# Patient Record
Sex: MALE | Race: WHITE | NOT HISPANIC OR LATINO | URBAN - METROPOLITAN AREA
[De-identification: names, ages, dates, MRNs, and addresses within clinical notes are randomized per-mention and may not be internally consistent; named-entity substitution may affect disease eponyms.]

---

## 2018-04-16 ENCOUNTER — EMERGENCY (EMERGENCY)
Facility: HOSPITAL | Age: 58
LOS: 1 days | Discharge: HOME | End: 2018-04-16
Attending: EMERGENCY MEDICINE | Admitting: EMERGENCY MEDICINE
Payer: COMMERCIAL

## 2018-04-16 VITALS
TEMPERATURE: 98 F | DIASTOLIC BLOOD PRESSURE: 105 MMHG | RESPIRATION RATE: 15 BRPM | HEART RATE: 77 BPM | HEIGHT: 69 IN | WEIGHT: 235.01 LBS | SYSTOLIC BLOOD PRESSURE: 200 MMHG

## 2018-04-16 VITALS
TEMPERATURE: 98 F | RESPIRATION RATE: 16 BRPM | DIASTOLIC BLOOD PRESSURE: 97 MMHG | OXYGEN SATURATION: 96 % | HEART RATE: 65 BPM | SYSTOLIC BLOOD PRESSURE: 140 MMHG

## 2018-04-16 LAB
ALBUMIN SERPL ELPH-MCNC: 3.9 G/DL — SIGNIFICANT CHANGE UP (ref 3.3–5)
ALP SERPL-CCNC: 99 U/L — SIGNIFICANT CHANGE UP (ref 40–120)
ALT FLD-CCNC: 22 U/L — SIGNIFICANT CHANGE UP (ref 12–78)
ANION GAP SERPL CALC-SCNC: 5 MMOL/L — SIGNIFICANT CHANGE UP (ref 5–17)
APTT BLD: 30.8 SEC — SIGNIFICANT CHANGE UP (ref 27.5–37.4)
AST SERPL-CCNC: 18 U/L — SIGNIFICANT CHANGE UP (ref 15–37)
BASOPHILS # BLD AUTO: 0.1 K/UL — SIGNIFICANT CHANGE UP (ref 0–0.2)
BASOPHILS NFR BLD AUTO: 1.1 % — SIGNIFICANT CHANGE UP (ref 0–2)
BILIRUB SERPL-MCNC: 1 MG/DL — SIGNIFICANT CHANGE UP (ref 0.2–1.2)
BUN SERPL-MCNC: 13 MG/DL — SIGNIFICANT CHANGE UP (ref 7–23)
CALCIUM SERPL-MCNC: 9.2 MG/DL — SIGNIFICANT CHANGE UP (ref 8.5–10.1)
CHLORIDE SERPL-SCNC: 106 MMOL/L — SIGNIFICANT CHANGE UP (ref 96–108)
CO2 SERPL-SCNC: 29 MMOL/L — SIGNIFICANT CHANGE UP (ref 22–31)
CREAT SERPL-MCNC: 0.92 MG/DL — SIGNIFICANT CHANGE UP (ref 0.5–1.3)
EOSINOPHIL # BLD AUTO: 0.1 K/UL — SIGNIFICANT CHANGE UP (ref 0–0.5)
EOSINOPHIL NFR BLD AUTO: 1.4 % — SIGNIFICANT CHANGE UP (ref 0–6)
GLUCOSE SERPL-MCNC: 101 MG/DL — HIGH (ref 70–99)
HCT VFR BLD CALC: 50.6 % — HIGH (ref 39–50)
HGB BLD-MCNC: 16.9 G/DL — SIGNIFICANT CHANGE UP (ref 13–17)
INR BLD: 1.04 RATIO — SIGNIFICANT CHANGE UP (ref 0.88–1.16)
LYMPHOCYTES # BLD AUTO: 2.1 K/UL — SIGNIFICANT CHANGE UP (ref 1–3.3)
LYMPHOCYTES # BLD AUTO: 22.9 % — SIGNIFICANT CHANGE UP (ref 13–44)
MCHC RBC-ENTMCNC: 30.7 PG — SIGNIFICANT CHANGE UP (ref 27–34)
MCHC RBC-ENTMCNC: 33.5 GM/DL — SIGNIFICANT CHANGE UP (ref 32–36)
MCV RBC AUTO: 91.7 FL — SIGNIFICANT CHANGE UP (ref 80–100)
MONOCYTES # BLD AUTO: 0.5 K/UL — SIGNIFICANT CHANGE UP (ref 0–0.9)
MONOCYTES NFR BLD AUTO: 5.1 % — SIGNIFICANT CHANGE UP (ref 1–9)
NEUTROPHILS # BLD AUTO: 6.2 K/UL — SIGNIFICANT CHANGE UP (ref 1.8–7.4)
NEUTROPHILS NFR BLD AUTO: 69.5 % — SIGNIFICANT CHANGE UP (ref 43–77)
PLATELET # BLD AUTO: 252 K/UL — SIGNIFICANT CHANGE UP (ref 150–400)
POTASSIUM SERPL-MCNC: 4.1 MMOL/L — SIGNIFICANT CHANGE UP (ref 3.5–5.3)
POTASSIUM SERPL-SCNC: 4.1 MMOL/L — SIGNIFICANT CHANGE UP (ref 3.5–5.3)
PROT SERPL-MCNC: 8.4 G/DL — HIGH (ref 6–8.3)
PROTHROM AB SERPL-ACNC: 11.4 SEC — SIGNIFICANT CHANGE UP (ref 9.8–12.7)
RBC # BLD: 5.52 M/UL — SIGNIFICANT CHANGE UP (ref 4.2–5.8)
RBC # FLD: 12 % — SIGNIFICANT CHANGE UP (ref 10.3–14.5)
SODIUM SERPL-SCNC: 140 MMOL/L — SIGNIFICANT CHANGE UP (ref 135–145)
WBC # BLD: 9 K/UL — SIGNIFICANT CHANGE UP (ref 3.8–10.5)
WBC # FLD AUTO: 9 K/UL — SIGNIFICANT CHANGE UP (ref 3.8–10.5)

## 2018-04-16 PROCEDURE — 96374 THER/PROPH/DIAG INJ IV PUSH: CPT

## 2018-04-16 PROCEDURE — 70450 CT HEAD/BRAIN W/O DYE: CPT

## 2018-04-16 PROCEDURE — 85730 THROMBOPLASTIN TIME PARTIAL: CPT

## 2018-04-16 PROCEDURE — 96375 TX/PRO/DX INJ NEW DRUG ADDON: CPT

## 2018-04-16 PROCEDURE — 99284 EMERGENCY DEPT VISIT MOD MDM: CPT

## 2018-04-16 PROCEDURE — 80053 COMPREHEN METABOLIC PANEL: CPT

## 2018-04-16 PROCEDURE — 99284 EMERGENCY DEPT VISIT MOD MDM: CPT | Mod: 25

## 2018-04-16 PROCEDURE — 85610 PROTHROMBIN TIME: CPT

## 2018-04-16 PROCEDURE — 70450 CT HEAD/BRAIN W/O DYE: CPT | Mod: 26

## 2018-04-16 PROCEDURE — 85027 COMPLETE CBC AUTOMATED: CPT

## 2018-04-16 PROCEDURE — 93005 ELECTROCARDIOGRAM TRACING: CPT

## 2018-04-16 RX ORDER — MORPHINE SULFATE 50 MG/1
2 CAPSULE, EXTENDED RELEASE ORAL ONCE
Qty: 0 | Refills: 0 | Status: DISCONTINUED | OUTPATIENT
Start: 2018-04-16 | End: 2018-04-16

## 2018-04-16 RX ORDER — DEXAMETHASONE 0.5 MG/5ML
10 ELIXIR ORAL ONCE
Qty: 0 | Refills: 0 | Status: COMPLETED | OUTPATIENT
Start: 2018-04-16 | End: 2018-04-16

## 2018-04-16 RX ORDER — METOCLOPRAMIDE HCL 10 MG
10 TABLET ORAL ONCE
Qty: 0 | Refills: 0 | Status: COMPLETED | OUTPATIENT
Start: 2018-04-16 | End: 2018-04-16

## 2018-04-16 RX ORDER — DEXAMETHASONE 0.5 MG/5ML
10 ELIXIR ORAL ONCE
Qty: 0 | Refills: 0 | Status: DISCONTINUED | OUTPATIENT
Start: 2018-04-16 | End: 2018-04-16

## 2018-04-16 RX ADMIN — MORPHINE SULFATE 2 MILLIGRAM(S): 50 CAPSULE, EXTENDED RELEASE ORAL at 11:20

## 2018-04-16 RX ADMIN — Medication 102 MILLIGRAM(S): at 13:23

## 2018-04-16 RX ADMIN — Medication 10 MILLIGRAM(S): at 10:44

## 2018-04-16 RX ADMIN — MORPHINE SULFATE 2 MILLIGRAM(S): 50 CAPSULE, EXTENDED RELEASE ORAL at 10:44

## 2018-04-16 NOTE — ED PROVIDER NOTE - CHPI ED SYMPTOMS NEG
no blurred vision/no numbness/no loss of consciousness/no vomiting/no nausea/no change in level of consciousness/no dizziness/no weakness

## 2018-04-16 NOTE — ED PROVIDER NOTE - OBJECTIVE STATEMENT
pt c/o elevated bp and right sided ha since last night. no fevers, chills, d/n/v, weakness, numbness, speech or vision changes, rash, neck stiffness, photophobia.  pmd - arcati pt c/o elevated bp and right sided ha since last night. no fevers, chills, d/n/v, weakness, numbness, speech or vision changes, rash, neck stiffness, photophobia.  pmd - arcati  cards - none

## 2018-04-16 NOTE — ED PROVIDER NOTE - PROGRESS NOTE DETAILS
birgit (neuro) seen eval pt, cleared for d/c and outpt f/u. D/W Kulwant (cards), he requests d/c pt to f/u in his office now. Reevaluated patient at bedside.  Patient feeling much improved, no pain, BP improved.  Discussed the results of all diagnostic testing in ED and copies of all reports given.   An opportunity to ask questions was given.  Discussed the importance of prompt, close medical follow-up.  Patient will return with any changes, concerns or persistent / worsening symptoms.  Understanding of all instructions verbalized. birgit (neuro) seen eval pt, cleared for d/c and outpt f/u. D/W Kulwant (cards), he requests d/c pt to f/u in his office now. Reevaluated patient at bedside.  Patient feeling much improved, no pain, BP improved. Pt declining LP.  Discussed the results of all diagnostic testing in ED and copies of all reports given.   An opportunity to ask questions was given.  Discussed the importance of prompt, close medical follow-up.  Patient will return with any changes, concerns or persistent / worsening symptoms.  Understanding of all instructions verbalized.

## 2018-04-16 NOTE — ED ADULT NURSE NOTE - OBJECTIVE STATEMENT
Pt presents with c/o right sided headache with pain to bilateral hands and legs/ no redness/ or swelling noted

## 2018-06-15 ENCOUNTER — INPATIENT (INPATIENT)
Facility: HOSPITAL | Age: 58
LOS: 2 days | Discharge: HOME | DRG: 419 | End: 2018-06-18
Attending: FAMILY MEDICINE | Admitting: FAMILY MEDICINE
Payer: COMMERCIAL

## 2018-06-15 VITALS
HEART RATE: 81 BPM | RESPIRATION RATE: 18 BRPM | SYSTOLIC BLOOD PRESSURE: 168 MMHG | DIASTOLIC BLOOD PRESSURE: 106 MMHG | HEIGHT: 69 IN | WEIGHT: 233.91 LBS | OXYGEN SATURATION: 96 % | TEMPERATURE: 98 F

## 2018-06-15 DIAGNOSIS — Z95.828 PRESENCE OF OTHER VASCULAR IMPLANTS AND GRAFTS: Chronic | ICD-10-CM

## 2018-06-15 LAB
BASOPHILS # BLD AUTO: 0.05 K/UL — SIGNIFICANT CHANGE UP (ref 0–0.2)
BASOPHILS NFR BLD AUTO: 0.5 % — SIGNIFICANT CHANGE UP (ref 0–2)
EOSINOPHIL # BLD AUTO: 0.29 K/UL — SIGNIFICANT CHANGE UP (ref 0–0.5)
EOSINOPHIL NFR BLD AUTO: 3.2 % — SIGNIFICANT CHANGE UP (ref 0–6)
HCT VFR BLD CALC: 43.8 % — SIGNIFICANT CHANGE UP (ref 39–50)
HGB BLD-MCNC: 14.9 G/DL — SIGNIFICANT CHANGE UP (ref 13–17)
IMM GRANULOCYTES NFR BLD AUTO: 0.4 % — SIGNIFICANT CHANGE UP (ref 0–1.5)
LYMPHOCYTES # BLD AUTO: 2.83 K/UL — SIGNIFICANT CHANGE UP (ref 1–3.3)
LYMPHOCYTES # BLD AUTO: 30.8 % — SIGNIFICANT CHANGE UP (ref 13–44)
MCHC RBC-ENTMCNC: 30.5 PG — SIGNIFICANT CHANGE UP (ref 27–34)
MCHC RBC-ENTMCNC: 34 GM/DL — SIGNIFICANT CHANGE UP (ref 32–36)
MCV RBC AUTO: 89.8 FL — SIGNIFICANT CHANGE UP (ref 80–100)
MONOCYTES # BLD AUTO: 0.73 K/UL — SIGNIFICANT CHANGE UP (ref 0–0.9)
MONOCYTES NFR BLD AUTO: 7.9 % — SIGNIFICANT CHANGE UP (ref 2–14)
NEUTROPHILS # BLD AUTO: 5.25 K/UL — SIGNIFICANT CHANGE UP (ref 1.8–7.4)
NEUTROPHILS NFR BLD AUTO: 57.2 % — SIGNIFICANT CHANGE UP (ref 43–77)
NRBC # BLD: 0 /100 WBCS — SIGNIFICANT CHANGE UP (ref 0–0)
PLATELET # BLD AUTO: 190 K/UL — SIGNIFICANT CHANGE UP (ref 150–400)
RBC # BLD: 4.88 M/UL — SIGNIFICANT CHANGE UP (ref 4.2–5.8)
RBC # FLD: 12.6 % — SIGNIFICANT CHANGE UP (ref 10.3–14.5)
WBC # BLD: 9.19 K/UL — SIGNIFICANT CHANGE UP (ref 3.8–10.5)
WBC # FLD AUTO: 9.19 K/UL — SIGNIFICANT CHANGE UP (ref 3.8–10.5)

## 2018-06-15 RX ORDER — ONDANSETRON 8 MG/1
4 TABLET, FILM COATED ORAL ONCE
Qty: 0 | Refills: 0 | Status: COMPLETED | OUTPATIENT
Start: 2018-06-15 | End: 2018-06-15

## 2018-06-15 RX ORDER — SODIUM CHLORIDE 9 MG/ML
3 INJECTION INTRAMUSCULAR; INTRAVENOUS; SUBCUTANEOUS ONCE
Qty: 0 | Refills: 0 | Status: COMPLETED | OUTPATIENT
Start: 2018-06-15 | End: 2018-06-15

## 2018-06-15 RX ADMIN — SODIUM CHLORIDE 3 MILLILITER(S): 9 INJECTION INTRAMUSCULAR; INTRAVENOUS; SUBCUTANEOUS at 23:48

## 2018-06-15 NOTE — ED ADULT NURSE NOTE - CHPI ED SYMPTOMS NEG
no cough/no dizziness/no back pain/no diaphoresis/no chills/no fever/no nausea/no shortness of breath/no vomiting

## 2018-06-15 NOTE — ED PROVIDER NOTE - OBJECTIVE STATEMENT
chest pain started about 2230 tonight while watching tv.  similar episode yesterday for hours, but didn't seek medical evaluation.  pt walked up 13 stairs, sob, passed out for seconds.  pt unable to move his left leg.  pmd- A [facility] cardio- Kulwant chest pain started about 2230 tonight while watching tv.  similar episode yesterday for hours, but didn't seek medical evaluation.  pt walked up 13 stairs, sob, passed out for seconds then   pt unable to move his left leg.  no back or leg pain. pmd- A [facility] cardio- Kulwant chest pain started about 2230 tonight while watching tv.  similar episode yesterday for hours, but didn't seek medical evaluation.  pt walked up 13 stairs, sob, passed out for seconds then   pt unable to move his left leg while he was in the ambulance.  no back or leg pain. pmd- A Arcati, cardio- Kulwant

## 2018-06-15 NOTE — ED PROVIDER NOTE - PROGRESS NOTE DETAILS
ED physician discussed extensively with the patient, family member, and/or healthcare proxy regarding risks, benefits and alternatives for emergenct CT angiogram.   Patient or healthcare proxy understands that the benefits of intravenous administration of iodinated constrast outweigh the risks, such as local tissue damage, allergic reaction, kidney damage/failure, or other life-threatening emergency.  An opportunity for questions or concerns was addressed by the ED physician All results were explained to patient and/or family and a copy of all available results given.  pt and daughter dw extensively regarding risks and benefits of TPA, but pt refused, will consult with wife.  pt was explained possible benefit of moving or walking again, but still refused. case dw Dr. Mo (neurologist) from [place] via [facility]. case dw Dr. Mo (neurologist) from [place] via [facility], no indication for endovascular intervention, recommended tpa case rk Chiang case rk Fish, jarvis Campbell (cardio) and Albertina (neuro)

## 2018-06-15 NOTE — ED ADULT NURSE NOTE - OBJECTIVE STATEMENT
58 year old male brought in by EMS from home complaining of chest pain. As per EMS chest pain, midsternal without radiation for one hour. Patient states when the pain started he tried to go upstairs at home to get aspirin. On the way to the bathroom, patient reports he fell. Patient does not remember entire event. Daughter reports patient did lose consciousness and was trying to wake him. On arrival to ED, patient reports continued chest pain, midsternal without radiation. Patient nauseous without any episodes of vomiting. Daughter explains syncopal episode. Patient also reports during route to the ED sudden onset of left lower extremity numbness/paralysis. Patient reports he is unable to move his left leg from the waist down. Unable to move foot or toes. Patient has mild sensation. Pulses positive throughout leg. Patient moving other extremities without difficulty, no numbness/tingling. Upper extremities within normal limits. No slurred speech or facial droop. Patient is alert and awake, cooperative. Mildly anxious.

## 2018-06-15 NOTE — ED PROVIDER NOTE - CARE PLAN
Principal Discharge DX:	Chest pain Principal Discharge DX:	Chest pain  Secondary Diagnosis:	Syncope Principal Discharge DX:	Chest pain  Secondary Diagnosis:	Syncope  Secondary Diagnosis:	CVA (cerebral vascular accident)

## 2018-06-16 DIAGNOSIS — I25.9 CHRONIC ISCHEMIC HEART DISEASE, UNSPECIFIED: ICD-10-CM

## 2018-06-16 DIAGNOSIS — R55 SYNCOPE AND COLLAPSE: ICD-10-CM

## 2018-06-16 DIAGNOSIS — R07.9 CHEST PAIN, UNSPECIFIED: ICD-10-CM

## 2018-06-16 DIAGNOSIS — K80.21 CALCULUS OF GALLBLADDER WITHOUT CHOLECYSTITIS WITH OBSTRUCTION: ICD-10-CM

## 2018-06-16 DIAGNOSIS — I10 ESSENTIAL (PRIMARY) HYPERTENSION: ICD-10-CM

## 2018-06-16 DIAGNOSIS — E78.00 PURE HYPERCHOLESTEROLEMIA, UNSPECIFIED: ICD-10-CM

## 2018-06-16 LAB
ALBUMIN SERPL ELPH-MCNC: 3.6 G/DL — SIGNIFICANT CHANGE UP (ref 3.3–5)
ALP SERPL-CCNC: 92 U/L — SIGNIFICANT CHANGE UP (ref 40–120)
ALT FLD-CCNC: 22 U/L — SIGNIFICANT CHANGE UP (ref 12–78)
ANION GAP SERPL CALC-SCNC: 6 MMOL/L — SIGNIFICANT CHANGE UP (ref 5–17)
ANION GAP SERPL CALC-SCNC: 8 MMOL/L — SIGNIFICANT CHANGE UP (ref 5–17)
APTT BLD: 30.6 SEC — SIGNIFICANT CHANGE UP (ref 27.5–37.4)
AST SERPL-CCNC: 18 U/L — SIGNIFICANT CHANGE UP (ref 15–37)
BILIRUB SERPL-MCNC: 0.7 MG/DL — SIGNIFICANT CHANGE UP (ref 0.2–1.2)
BLD GP AB SCN SERPL QL: SIGNIFICANT CHANGE UP
BUN SERPL-MCNC: 11 MG/DL — SIGNIFICANT CHANGE UP (ref 7–23)
BUN SERPL-MCNC: 9 MG/DL — SIGNIFICANT CHANGE UP (ref 7–23)
CALCIUM SERPL-MCNC: 8.6 MG/DL — SIGNIFICANT CHANGE UP (ref 8.5–10.1)
CALCIUM SERPL-MCNC: 8.9 MG/DL — SIGNIFICANT CHANGE UP (ref 8.5–10.1)
CHLORIDE SERPL-SCNC: 105 MMOL/L — SIGNIFICANT CHANGE UP (ref 96–108)
CHLORIDE SERPL-SCNC: 108 MMOL/L — SIGNIFICANT CHANGE UP (ref 96–108)
CK MB BLD-MCNC: 0.7 % — SIGNIFICANT CHANGE UP (ref 0–3.5)
CK MB CFR SERPL CALC: 0.7 NG/ML — SIGNIFICANT CHANGE UP (ref 0–3.6)
CK SERPL-CCNC: 101 U/L — SIGNIFICANT CHANGE UP (ref 26–308)
CO2 SERPL-SCNC: 26 MMOL/L — SIGNIFICANT CHANGE UP (ref 22–31)
CO2 SERPL-SCNC: 31 MMOL/L — SIGNIFICANT CHANGE UP (ref 22–31)
CREAT SERPL-MCNC: 0.89 MG/DL — SIGNIFICANT CHANGE UP (ref 0.5–1.3)
CREAT SERPL-MCNC: 0.9 MG/DL — SIGNIFICANT CHANGE UP (ref 0.5–1.3)
ESTIMATED AVERAGE GLUCOSE: 117 MG/DL — HIGH (ref 68–114)
GLUCOSE SERPL-MCNC: 126 MG/DL — HIGH (ref 70–99)
GLUCOSE SERPL-MCNC: 84 MG/DL — SIGNIFICANT CHANGE UP (ref 70–99)
HBA1C BLD-MCNC: 5.7 % — HIGH (ref 4–5.6)
HBA1C BLD-MCNC: 5.7 % — HIGH (ref 4–5.6)
HCT VFR BLD CALC: 43.8 % — SIGNIFICANT CHANGE UP (ref 39–50)
HGB BLD-MCNC: 15.2 G/DL — SIGNIFICANT CHANGE UP (ref 13–17)
INR BLD: 1.02 RATIO — SIGNIFICANT CHANGE UP (ref 0.88–1.16)
MCHC RBC-ENTMCNC: 31.5 PG — SIGNIFICANT CHANGE UP (ref 27–34)
MCHC RBC-ENTMCNC: 34.7 GM/DL — SIGNIFICANT CHANGE UP (ref 32–36)
MCV RBC AUTO: 90.7 FL — SIGNIFICANT CHANGE UP (ref 80–100)
NRBC # BLD: 0 /100 WBCS — SIGNIFICANT CHANGE UP (ref 0–0)
PLATELET # BLD AUTO: 194 K/UL — SIGNIFICANT CHANGE UP (ref 150–400)
POTASSIUM SERPL-MCNC: 3.5 MMOL/L — SIGNIFICANT CHANGE UP (ref 3.5–5.3)
POTASSIUM SERPL-MCNC: 3.9 MMOL/L — SIGNIFICANT CHANGE UP (ref 3.5–5.3)
POTASSIUM SERPL-SCNC: 3.5 MMOL/L — SIGNIFICANT CHANGE UP (ref 3.5–5.3)
POTASSIUM SERPL-SCNC: 3.9 MMOL/L — SIGNIFICANT CHANGE UP (ref 3.5–5.3)
PROT SERPL-MCNC: 7.5 G/DL — SIGNIFICANT CHANGE UP (ref 6–8.3)
PROTHROM AB SERPL-ACNC: 11.1 SEC — SIGNIFICANT CHANGE UP (ref 9.8–12.7)
RBC # BLD: 4.83 M/UL — SIGNIFICANT CHANGE UP (ref 4.2–5.8)
RBC # FLD: 12.5 % — SIGNIFICANT CHANGE UP (ref 10.3–14.5)
SODIUM SERPL-SCNC: 142 MMOL/L — SIGNIFICANT CHANGE UP (ref 135–145)
SODIUM SERPL-SCNC: 142 MMOL/L — SIGNIFICANT CHANGE UP (ref 135–145)
TROPONIN I SERPL-MCNC: <.015 NG/ML — SIGNIFICANT CHANGE UP (ref 0.01–0.04)
WBC # BLD: 9.7 K/UL — SIGNIFICANT CHANGE UP (ref 3.8–10.5)
WBC # FLD AUTO: 9.7 K/UL — SIGNIFICANT CHANGE UP (ref 3.8–10.5)

## 2018-06-16 PROCEDURE — 71045 X-RAY EXAM CHEST 1 VIEW: CPT | Mod: 26

## 2018-06-16 PROCEDURE — 76705 ECHO EXAM OF ABDOMEN: CPT | Mod: 26

## 2018-06-16 PROCEDURE — 70496 CT ANGIOGRAPHY HEAD: CPT | Mod: 26

## 2018-06-16 PROCEDURE — 70450 CT HEAD/BRAIN W/O DYE: CPT | Mod: 26,59

## 2018-06-16 PROCEDURE — 99285 EMERGENCY DEPT VISIT HI MDM: CPT

## 2018-06-16 PROCEDURE — 70551 MRI BRAIN STEM W/O DYE: CPT | Mod: 26

## 2018-06-16 PROCEDURE — 70498 CT ANGIOGRAPHY NECK: CPT | Mod: 26

## 2018-06-16 PROCEDURE — 74175 CTA ABDOMEN W/CONTRAST: CPT | Mod: 26

## 2018-06-16 PROCEDURE — 71275 CT ANGIOGRAPHY CHEST: CPT | Mod: 26

## 2018-06-16 RX ORDER — ASPIRIN/CALCIUM CARB/MAGNESIUM 324 MG
325 TABLET ORAL DAILY
Qty: 0 | Refills: 0 | Status: DISCONTINUED | OUTPATIENT
Start: 2018-06-16 | End: 2018-06-16

## 2018-06-16 RX ORDER — ZOLPIDEM TARTRATE 10 MG/1
5 TABLET ORAL AT BEDTIME
Qty: 0 | Refills: 0 | Status: DISCONTINUED | OUTPATIENT
Start: 2018-06-16 | End: 2018-06-18

## 2018-06-16 RX ORDER — PANTOPRAZOLE SODIUM 20 MG/1
40 TABLET, DELAYED RELEASE ORAL DAILY
Qty: 0 | Refills: 0 | Status: DISCONTINUED | OUTPATIENT
Start: 2018-06-16 | End: 2018-06-18

## 2018-06-16 RX ORDER — ONDANSETRON 8 MG/1
4 TABLET, FILM COATED ORAL ONCE
Qty: 0 | Refills: 0 | Status: COMPLETED | OUTPATIENT
Start: 2018-06-16 | End: 2018-06-16

## 2018-06-16 RX ORDER — HEPARIN SODIUM 5000 [USP'U]/ML
5000 INJECTION INTRAVENOUS; SUBCUTANEOUS EVERY 12 HOURS
Qty: 0 | Refills: 0 | Status: DISCONTINUED | OUTPATIENT
Start: 2018-06-16 | End: 2018-06-17

## 2018-06-16 RX ORDER — ASPIRIN/CALCIUM CARB/MAGNESIUM 324 MG
81 TABLET ORAL DAILY
Qty: 0 | Refills: 0 | Status: DISCONTINUED | OUTPATIENT
Start: 2018-06-16 | End: 2018-06-18

## 2018-06-16 RX ORDER — ATORVASTATIN CALCIUM 80 MG/1
40 TABLET, FILM COATED ORAL AT BEDTIME
Qty: 0 | Refills: 0 | Status: DISCONTINUED | OUTPATIENT
Start: 2018-06-16 | End: 2018-06-18

## 2018-06-16 RX ORDER — MORPHINE SULFATE 50 MG/1
2 CAPSULE, EXTENDED RELEASE ORAL ONCE
Qty: 0 | Refills: 0 | Status: DISCONTINUED | OUTPATIENT
Start: 2018-06-16 | End: 2018-06-16

## 2018-06-16 RX ORDER — MORPHINE SULFATE 50 MG/1
4 CAPSULE, EXTENDED RELEASE ORAL ONCE
Qty: 0 | Refills: 0 | Status: DISCONTINUED | OUTPATIENT
Start: 2018-06-16 | End: 2018-06-16

## 2018-06-16 RX ORDER — ASPIRIN/CALCIUM CARB/MAGNESIUM 324 MG
325 TABLET ORAL ONCE
Qty: 0 | Refills: 0 | Status: COMPLETED | OUTPATIENT
Start: 2018-06-16 | End: 2018-06-16

## 2018-06-16 RX ADMIN — HEPARIN SODIUM 5000 UNIT(S): 5000 INJECTION INTRAVENOUS; SUBCUTANEOUS at 07:00

## 2018-06-16 RX ADMIN — MORPHINE SULFATE 2 MILLIGRAM(S): 50 CAPSULE, EXTENDED RELEASE ORAL at 00:45

## 2018-06-16 RX ADMIN — ATORVASTATIN CALCIUM 40 MILLIGRAM(S): 80 TABLET, FILM COATED ORAL at 22:24

## 2018-06-16 RX ADMIN — Medication 1 MILLIGRAM(S): at 12:05

## 2018-06-16 RX ADMIN — MORPHINE SULFATE 2 MILLIGRAM(S): 50 CAPSULE, EXTENDED RELEASE ORAL at 01:15

## 2018-06-16 RX ADMIN — ZOLPIDEM TARTRATE 5 MILLIGRAM(S): 10 TABLET ORAL at 22:25

## 2018-06-16 RX ADMIN — MORPHINE SULFATE 4 MILLIGRAM(S): 50 CAPSULE, EXTENDED RELEASE ORAL at 02:18

## 2018-06-16 RX ADMIN — HEPARIN SODIUM 5000 UNIT(S): 5000 INJECTION INTRAVENOUS; SUBCUTANEOUS at 17:05

## 2018-06-16 RX ADMIN — MORPHINE SULFATE 4 MILLIGRAM(S): 50 CAPSULE, EXTENDED RELEASE ORAL at 02:35

## 2018-06-16 RX ADMIN — Medication 325 MILLIGRAM(S): at 12:22

## 2018-06-16 RX ADMIN — ONDANSETRON 4 MILLIGRAM(S): 8 TABLET, FILM COATED ORAL at 00:00

## 2018-06-16 NOTE — H&P ADULT - NSHPLABSRESULTS_GEN_ALL_CORE
14.9   9.19  )-----------( 190      ( 15 Johny 2018 23:56 )             43.8     15 Johny 2018 23:56    142    |  108    |  11     ----------------------------<  126    3.5     |  26     |  0.89     Ca    8.6        15 Johny 2018 23:56    TPro  7.5    /  Alb  3.6    /  TBili  0.7    /  DBili  x      /  AST  18     /  ALT  22     /  AlkPhos  92     15 Johny 2018 23:56    LIVER FUNCTIONS - ( 15 Johny 2018 23:56 )  Alb: 3.6 g/dL / Pro: 7.5 g/dL / ALK PHOS: 92 U/L / ALT: 22 U/L / AST: 18 U/L / GGT: x           PT/INR - ( 16 Jun 2018 00:20 )   PT: 11.1 sec;   INR: 1.02 ratio         PTT - ( 16 Jun 2018 00:20 )  PTT:30.6 sec  CAPILLARY BLOOD GLUCOSE        CARDIAC MARKERS ( 15 Johny 2018 23:56 )  <.015 ng/mL / x     / 101 U/L / x     / 0.7 ng/mL 14.9   9.19  )-----------( 190      ( 15 Johny 2018 23:56 )             43.8     15 Johny 2018 23:56    142    |  108    |  11     ----------------------------<  126    3.5     |  26     |  0.89     Ca    8.6        15 Johny 2018 23:56    TPro  7.5    /  Alb  3.6    /  TBili  0.7    /  DBili  x      /  AST  18     /  ALT  22     /  AlkPhos  92     15 Johny 2018 23:56    LIVER FUNCTIONS - ( 15 Johny 2018 23:56 )  Alb: 3.6 g/dL / Pro: 7.5 g/dL / ALK PHOS: 92 U/L / ALT: 22 U/L / AST: 18 U/L / GGT: x           PT/INR - ( 16 Jun 2018 00:20 )   PT: 11.1 sec;   INR: 1.02 ratio         PTT - ( 16 Jun 2018 00:20 )  PTT:30.6 sec  CAPILLARY BLOOD GLUCOSE        CARDIAC MARKERS ( 15 Johny 2018 23:56 )  <.015 ng/mL / x     / 101 U/L / x     / 0.7 ng/mL    < from: CT Angio Neck w/ IV Cont (06.16.18 @ 02:31) >    IMPRESSION:    CTA neck and head: No acute arterial findings.    Similar-appearing right-sided neck mass, as discussed, when compared with   the prior CT examination of the neck from 2/20/2014. Recommend ENT   consultation and continued clinical follow-up.    < end of copied text >    < from: CT Angio Abdomen w/ IV Cont (06.16.18 @ 00:42) >    IMPRESSION:  No evidence of an aortic dissection.                JAMIE WILCOX M.D., ATTENDING RADIOLOGIST  This documents been electronically signed. Jun 16 2018  1:49AM    < end of copied text >

## 2018-06-16 NOTE — ED ADULT NURSE REASSESSMENT NOTE - NS ED NURSE REASSESS COMMENT FT1
Bedside dysphagia screen passed. Patient and family confirm refusal of TPA administration. Plan for tele admission. Awaiting admission assessment and orders. Awaiting bed assignment. Safety maintained.

## 2018-06-16 NOTE — PROGRESS NOTE ADULT - SUBJECTIVE AND OBJECTIVE BOX
Patient is a 58y old  Male who presents with a chief complaint of Chest pain at rest. (16 Jun 2018 06:10)      INTERVAL /OVERNIGHT EVENTS: epigatric pain better    MEDICATIONS  (STANDING):  aspirin enteric coated 81 milliGRAM(s) Oral daily  atorvastatin 40 milliGRAM(s) Oral at bedtime  heparin  Injectable 5000 Unit(s) SubCutaneous every 12 hours  pantoprazole  Injectable 40 milliGRAM(s) IV Push daily    MEDICATIONS  (PRN):  aluminum hydroxide/magnesium hydroxide/simethicone Suspension 30 milliLiter(s) Oral every 4 hours PRN Dyspepsia      Allergies    No Known Allergies    Intolerances        REVIEW OF SYSTEMS:  CONSTITUTIONAL: No fever, weight loss, or fatigue  EYES: No eye pain, visual disturbances, or discharge  ENMT:  No difficulty hearing, tinnitus, vertigo; No sinus or throat pain  NECK: No pain or stiffness  RESPIRATORY: No cough, wheezing, chills or hemoptysis; No shortness of breath  CARDIOVASCULAR: No chest pain, palpitations, dizziness, or leg swelling  GASTROINTESTINAL:  abdominal + epigastric pain. No nausea, vomiting, or hematemesis; No diarrhea or constipation. No melena or hematochezia.  GENITOURINARY: No dysuria, frequency, hematuria, or incontinence  NEUROLOGICAL: No headaches, memory loss, loss of strength, numbness, or tremors  SKIN: No itching, burning, rashes, or lesions   LYMPH NODES: No enlarged glands  ENDOCRINE: No heat or cold intolerance; No hair loss; No polydipsia or polyuria  MUSCULOSKELETAL: No joint pain or swelling; No muscle, back, or extremity pain  PSYCHIATRIC: No depression, anxiety, mood swings, or difficulty sleeping  HEME/LYMPH: No easy bruising, or bleeding gums  ALLERGY AND IMMUNOLOGIC: No hives or eczema    Vital Signs Last 24 Hrs  T(C): 36.5 (16 Jun 2018 16:19), Max: 36.8 (15 Johny 2018 23:32)  T(F): 97.7 (16 Jun 2018 16:19), Max: 98.3 (15 Johny 2018 23:32)  HR: 60 (16 Jun 2018 16:19) (60 - 81)  BP: 117/80 (16 Jun 2018 16:19) (114/80 - 184/81)  BP(mean): --  RR: 18 (16 Jun 2018 16:19) (15 - 20)  SpO2: 98% (16 Jun 2018 16:19) (96% - 100%)    PHYSICAL EXAM:  GENERAL: NAD, well-groomed, well-developed  HEAD:  Atraumatic, Normocephalic  EYES: EOMI, PERRLA, conjunctiva and sclera clear  ENMT: No tonsillar erythema, exudates, or enlargement; Moist mucous membranes, Good dentition, No lesions  NECK: Supple, No JVD, Normal thyroid  NERVOUS SYSTEM:  Alert & Oriented X3, Good concentration; Motor Strength 5/5 B/L upper and lower extremities; DTRs 2+ intact and symmetric  CHEST/LUNG: Clear to auscultation bilaterally; No rales, rhonchi, wheezing, or rubs  HEART: Regular rate and rhythm; No murmurs, rubs, or gallops  ABDOMEN: Soft, Nontender, Nondistended; Bowel sounds present  EXTREMITIES:  2+ Peripheral Pulses, No clubbing, cyanosis, or edema  LYMPH: No lymphadenopathy noted  SKIN: No rashes or lesions    LABS:                        14.9   9.19  )-----------( 190      ( 15 Johny 2018 23:56 )             43.8     16 Jun 2018 14:00    142    |  105    |  9      ----------------------------<  84     3.9     |  31     |  0.90     Ca    8.9        16 Jun 2018 14:00    TPro  7.5    /  Alb  3.6    /  TBili  0.7    /  DBili  x      /  AST  18     /  ALT  22     /  AlkPhos  92     15 Johny 2018 23:56    PT/INR - ( 16 Jun 2018 00:20 )   PT: 11.1 sec;   INR: 1.02 ratio         PTT - ( 16 Jun 2018 00:20 )  PTT:30.6 sec    CAPILLARY BLOOD GLUCOSE          RADIOLOGY & ADDITIONAL TESTS:    Notes Reviewed:  [ X] YES  [ ] NO    Care Discussed with Consultants/Other Providers [X ] YES  [ ] NO

## 2018-06-16 NOTE — ED ADULT NURSE REASSESSMENT NOTE - NS ED NURSE REASSESS COMMENT FT1
Patient requesting ambien to sleep. Ambien 10mg daily bedtime medication. [facility] at [facility]. Will continue to try to contact.

## 2018-06-16 NOTE — PROGRESS NOTE ADULT - ASSESSMENT
This 59 YO M has lower chest and epigastric pain without nausea vomiting.  His most of diagnostic workup is with in normal limits.  Patient has gall stones and his symptoms may related to the gall bladder.

## 2018-06-16 NOTE — ED ADULT NURSE REASSESSMENT NOTE - NS ED NURSE REASSESS COMMENT FT1
Patient transported to CT with RN and EDT. Patient on bedside cardiac monitor. Patient tolerated well. Awaiting results.

## 2018-06-16 NOTE — H&P ADULT - ASSESSMENT
This 57 YO M has lower chest and epigastric pain without nausea vomiting.  His most of diagnostic workup is with in normal limits.  Patient has gall stones and his symptoms may related to the gall bladder.

## 2018-06-16 NOTE — H&P ADULT - NEGATIVE NEUROLOGICAL SYMPTOMS
no paresthesias/no syncope/no transient paralysis/no weakness/no generalized seizures/no tremors/no focal seizures

## 2018-06-16 NOTE — H&P ADULT - RS GEN PE MLT RESP DETAILS PC
no intercostal retractions/airway patent/respirations non-labored/no chest wall tenderness/good air movement/no rales/breath sounds equal

## 2018-06-16 NOTE — ED ADULT NURSE REASSESSMENT NOTE - NS ED NURSE REASSESS COMMENT FT1
Abe consulted for admission orders. As per MD, must wait until TPA window is closed. Patient to remain in ED until 0430. Will attempt to call MD for orders after TPA window is closed.

## 2018-06-16 NOTE — H&P ADULT - HISTORY OF PRESENT ILLNESS
Savannah Evans is a 58y Male complaining of chest pain.  Chest pain started about 2230 tonight while watching TV.  Similar episode day before for hours, but didn't seek medical attention.  Patient walked up 13 stairs, developed SOB and passed out for few seconds, he was unable to move his left leg while he was in the ambulance.  No back or leg pain.

## 2018-06-17 ENCOUNTER — TRANSCRIPTION ENCOUNTER (OUTPATIENT)
Age: 58
End: 2018-06-17

## 2018-06-17 LAB
AMYLASE P1 CFR SERPL: 46 U/L — SIGNIFICANT CHANGE UP (ref 25–115)
CHOLEST SERPL-MCNC: 173 MG/DL — SIGNIFICANT CHANGE UP (ref 10–199)
HCT VFR BLD CALC: 44.4 % — SIGNIFICANT CHANGE UP (ref 39–50)
HDLC SERPL-MCNC: 28 MG/DL — LOW (ref 40–125)
HGB BLD-MCNC: 14.9 G/DL — SIGNIFICANT CHANGE UP (ref 13–17)
LIDOCAIN IGE QN: 67 U/L — LOW (ref 73–393)
LIPID PNL WITH DIRECT LDL SERPL: 102 MG/DL — SIGNIFICANT CHANGE UP
TOTAL CHOLESTEROL/HDL RATIO MEASUREMENT: 6.2 RATIO — SIGNIFICANT CHANGE UP (ref 3.4–9.6)
TRIGL SERPL-MCNC: 217 MG/DL — HIGH (ref 10–149)

## 2018-06-17 RX ORDER — HEPARIN SODIUM 5000 [USP'U]/ML
5000 INJECTION INTRAVENOUS; SUBCUTANEOUS EVERY 8 HOURS
Qty: 0 | Refills: 0 | Status: DISCONTINUED | OUTPATIENT
Start: 2018-06-17 | End: 2018-06-18

## 2018-06-17 RX ORDER — CEFAZOLIN SODIUM 1 G
2000 VIAL (EA) INJECTION ONCE
Qty: 0 | Refills: 0 | Status: COMPLETED | OUTPATIENT
Start: 2018-06-17 | End: 2018-06-17

## 2018-06-17 RX ADMIN — HEPARIN SODIUM 5000 UNIT(S): 5000 INJECTION INTRAVENOUS; SUBCUTANEOUS at 22:20

## 2018-06-17 RX ADMIN — Medication 81 MILLIGRAM(S): at 13:47

## 2018-06-17 RX ADMIN — ZOLPIDEM TARTRATE 5 MILLIGRAM(S): 10 TABLET ORAL at 01:10

## 2018-06-17 RX ADMIN — ZOLPIDEM TARTRATE 5 MILLIGRAM(S): 10 TABLET ORAL at 23:43

## 2018-06-17 RX ADMIN — ZOLPIDEM TARTRATE 5 MILLIGRAM(S): 10 TABLET ORAL at 22:20

## 2018-06-17 RX ADMIN — HEPARIN SODIUM 5000 UNIT(S): 5000 INJECTION INTRAVENOUS; SUBCUTANEOUS at 13:47

## 2018-06-17 RX ADMIN — ATORVASTATIN CALCIUM 40 MILLIGRAM(S): 80 TABLET, FILM COATED ORAL at 22:21

## 2018-06-17 RX ADMIN — HEPARIN SODIUM 5000 UNIT(S): 5000 INJECTION INTRAVENOUS; SUBCUTANEOUS at 06:53

## 2018-06-17 RX ADMIN — PANTOPRAZOLE SODIUM 40 MILLIGRAM(S): 20 TABLET, DELAYED RELEASE ORAL at 13:47

## 2018-06-17 NOTE — PHYSICAL THERAPY INITIAL EVALUATION ADULT - PERTINENT HX OF CURRENT PROBLEM, REHAB EVAL
Brandon Evans is a 58y Male complaining of chest pain.  Patient walked up 13 stairs, developed SOB and passed out for few seconds.

## 2018-06-17 NOTE — PROGRESS NOTE ADULT - SUBJECTIVE AND OBJECTIVE BOX
INTERVAL HPI/OVERNIGHT EVENTS:  HPI:  No new overnight event.  No N/V/D.  Tolerating diet.  no pain    MEDICATIONS  (STANDING):  aspirin enteric coated 81 milliGRAM(s) Oral daily  atorvastatin 40 milliGRAM(s) Oral at bedtime  ceFAZolin   IVPB 2000 milliGRAM(s) IV Intermittent once  heparin  Injectable 5000 Unit(s) SubCutaneous every 8 hours  pantoprazole  Injectable 40 milliGRAM(s) IV Push daily    MEDICATIONS  (PRN):  aluminum hydroxide/magnesium hydroxide/simethicone Suspension 30 milliLiter(s) Oral every 4 hours PRN Dyspepsia  zolpidem 5 milliGRAM(s) Oral at bedtime PRN Insomnia  zolpidem 5 milliGRAM(s) Oral at bedtime PRN Insomnia      Allergies    No Known Allergies    Intolerances          General:  No wt loss, fevers, chills, night sweats, fatigue,   Eyes:  Good vision, no reported pain  ENT:  No sore throat, pain, runny nose, dysphagia  CV:  No pain, palpitations, hypo/hypertension  Resp:  No dyspnea, cough, tachypnea, wheezing  GI:  No pain, No nausea, No vomiting, No diarrhea, No constipation, No weight loss, No fever, No pruritis, No rectal bleeding, No tarry stools, No dysphagia,  :  No pain, bleeding, incontinence, nocturia  Muscle:  No pain, weakness  Neuro:  No weakness, tingling, memory problems  Psych:  No fatigue, insomnia, mood problems, depression  Endocrine:  No polyuria, polydipsia, cold/heat intolerance  Heme:  No petechiae, ecchymosis, easy bruisability  Skin:  No rash, tattoos, scars, edema      PHYSICAL EXAM:   Vital Signs:  Vital Signs Last 24 Hrs  T(C): 36.8 (17 Jun 2018 11:21), Max: 36.8 (16 Jun 2018 20:20)  T(F): 98.3 (17 Jun 2018 11:21), Max: 98.3 (17 Jun 2018 11:21)  HR: 68 (17 Jun 2018 11:21) (60 - 98)  BP: 130/83 (17 Jun 2018 11:21) (117/80 - 151/83)  BP(mean): --  RR: 16 (17 Jun 2018 11:21) (16 - 118)  SpO2: 96% (17 Jun 2018 11:21) (95% - 99%)  Daily     Daily I&O's Summary      GENERAL:  Appears stated age, well-groomed, well-nourished, no distress  HEENT:  NC/AT,  conjunctivae clear and pink, no thyromegaly, nodules, adenopathy, no JVD, sclera -anicteric  CHEST:  Full & symmetric excursion, no increased effort, breath sounds clear  HEART:  Regular rhythm, S1, S2, no murmur/rub/S3/S4, no abdominal bruit, no edema  ABDOMEN:  Soft, non-tender, non-distended, normoactive bowel sounds,  no masses ,no hepato-splenomegaly, no signs of chronic liver disease  EXTEREMITIES:  no cyanosis,clubbing or edema  SKIN:  No rash/erythema/ecchymoses/petechiae/wounds/abscess/warm/dry  NEURO:  Alert, oriented, no asterixis, no tremor, no encephalopathy      LABS:                        14.9   x     )-----------( x        ( 17 Jun 2018 06:24 )             44.4     06-16    142  |  105  |  9   ----------------------------<  84  3.9   |  31  |  0.90    Ca    8.9      16 Jun 2018 14:00    TPro  7.5  /  Alb  3.6  /  TBili  0.7  /  DBili  x   /  AST  18  /  ALT  22  /  AlkPhos  92  06-15    PT/INR - ( 16 Jun 2018 00:20 )   PT: 11.1 sec;   INR: 1.02 ratio         PTT - ( 16 Jun 2018 00:20 )  PTT:30.6 sec    amylaseAmylase, Serum Total: 46 U/L (06-17 @ 06:24)  Amylase, Serum Total: 57 U/L (06-16 @ 14:00)     lipaseLipase, Serum: 67 U/L (06-17 @ 06:24)  Lipase, Serum: 105 U/L (06-16 @ 14:00)    RADIOLOGY & ADDITIONAL TESTS:

## 2018-06-17 NOTE — CONSULT NOTE ADULT - SUBJECTIVE AND OBJECTIVE BOX
Patient is a 58y old  Male who presents with a chief complaint of Chest pain at rest. (16 Jun 2018 06:10)      HPI: Pt reports a similar episode two weeks ago and was seen and [facility].  Pt had a full cardiac work up including a stress test by history that was normal.  Pt presents with a second episode of pain: his pain was in the center of his chest and radiated to his back.  He denies nausea or vomiting.  Denies fatty food intolerance.  Pt has a history of AAA that was stented two years ago, ct angio was done in ER. Denies family history of gallbladder disease.    MEDICATIONS:    MEDICATIONS  (STANDING):  aspirin enteric coated 81 milliGRAM(s) Oral daily  atorvastatin 40 milliGRAM(s) Oral at bedtime  ceFAZolin   IVPB 2000 milliGRAM(s) IV Intermittent once  heparin  Injectable 5000 Unit(s) SubCutaneous every 8 hours  pantoprazole  Injectable 40 milliGRAM(s) IV Push daily    MEDICATIONS  (PRN):  aluminum hydroxide/magnesium hydroxide/simethicone Suspension 30 milliLiter(s) Oral every 4 hours PRN Dyspepsia  zolpidem 5 milliGRAM(s) Oral at bedtime PRN Insomnia  zolpidem 5 milliGRAM(s) Oral at bedtime PRN Insomnia      Allergies    No Known Allergies    Intolerances        PAST MEDICAL & SURGICAL HISTORY:  HTN (hypertension)  High cholesterol  H/O endovascular stent graft for abdominal aortic aneurysm        ROS:    CONSTITUTIONAL: No fever, weight loss, or fatigue  EYES: No eye pain, visual disturbances, or discharge, no icteris  ENMT:  No difficulty hearing, tinnitus, vertigo; No sinus or throat pain  NECK: No pain or stiffness  BREASTS: No pain, masses, or nipple discharge  RESPIRATORY: No cough, wheezing, chills or hemoptysis; with shortness of breath with pain  CARDIOVASCULAR: central chest pain with radiation to his back, palpitations, dizziness, or leg swelling  GASTROINTESTINAL: No abdominal or epigastric pain. No nausea, vomiting, or hematemesis; No diarrhea or constipation. No melena or hematochezia.  GENITOURINARY: No dysuria, frequency, hematuria, or incontinence  SKIN: No itching, burning, rashes, or lesions   LYMPH NODES: No enlarged glands  ENDOCRINE: No heat or cold intolerance; No hair loss  MUSCULOSKELETAL: No muscle or back pain  HEME/LYMPH: No easy bruising, or bleeding gums  ALLERGY AND IMMUNOLOGIC: No hives or eczema    Vital Signs Last 24 Hrs  T(C): 36.8 (17 Jun 2018 11:21), Max: 36.8 (16 Jun 2018 20:20)  T(F): 98.3 (17 Jun 2018 11:21), Max: 98.3 (17 Jun 2018 11:21)  HR: 68 (17 Jun 2018 11:21) (60 - 98)  BP: 130/83 (17 Jun 2018 11:21) (117/80 - 151/83)  BP(mean): --  RR: 16 (17 Jun 2018 11:21) (16 - 118)  SpO2: 96% (17 Jun 2018 11:21) (95% - 99%)    PHYSICAL EXAM:    Constitutional: NAD well-developed A: Ox3  HEENT: PERRLA, EOMI, nonicteric  Neck: No JVD  Respiratory: NSR, without murmurs  Gastrointestinal: Normal bowel sounds, with out distension, with RUQ tenderness with guarding without rebound  Extremities: No peripheral edema  Vascular: 2+ peripheral pulses  Neurological: A/O x 3, no focal deficits  Skin: No rashes    LABS:                        14.9   x     )-----------( x        ( 17 Jun 2018 06:24 )             44.4     06-16    142  |  105  |  9   ----------------------------<  84  3.9   |  31  |  0.90    Ca    8.9      16 Jun 2018 14:00    TPro  7.5  /  Alb  3.6  /  TBili  0.7  /  DBili  x   /  AST  18  /  ALT  22  /  AlkPhos  92  06-15    PT/INR - ( 16 Jun 2018 00:20 )   PT: 11.1 sec;   INR: 1.02 ratio         PTT - ( 16 Jun 2018 00:20 )  PTT:30.6 sec    Hemoglobin A1C, Whole Blood: 5.7 % (06-16 @ 10:07)  Hemoglobin A1C, Whole Blood: 5.7 % (06-16 @ 10:07)      RADIOLOGY & ADDITIONAL STUDIES:    < from: US Abdomen Limited (06.16.18 @ 11:29) >  EXAM:  US ABDOMEN LIMITED                            PROCEDURE DATE:  06/16/2018          INTERPRETATION:  CLINICAL INFORMATION: Atypical chest pain with possible   cholecystitis    COMPARISON: CT abdomen performed earlier the same day    TECHNIQUE: Sonography of the right upper quadrant.     FINDINGS:    Liver: Prominent at 17.2    Bile ducts: Normal caliber. Common bile duct measures 3.2 mm.     Gallbladder: Nondistended with small echoing gallstones. Normal wall   thickness of 1.4 mm. No sonographic Mosley sign was reported.        Pancreas: Not seen presumably due to overlying bowel gas    Right kidney: 10.9 cm. No hydronephrosis.        Ascites: None.    IVC: Visualized portions are within normal limits.    IMPRESSION:     Gallbladder stones. No sonographic evidence of cholecystitis.        < end of copied text >  < from: CT Angio Chest w/ IV Cont (06.16.18 @ 00:44) >  EXAM:  CT ANGIO ABDOMEN ONLY (W)AW IC                          EXAM:  CT ANGIO CHEST (W)AW IC                            PROCEDURE DATE:  06/16/2018          INTERPRETATION:  CLINICAL INFORMATION: Chest pain. History of AAA repair.    TECHNIQUE: CTangiogram of the chest, abdomen and pelvis was performed   after the administration of intravenous contrast and according to   departmental dissection protocol. 96 mls of Omnipaque 350 was   administered without complication and 4 mls were discarded.Coronal and   sagittal reformats were performed in addition to axial MIP reformats.    COMPARISON: CT of the abdomen and pelvis from 8/6/2012.    FINDINGS:    Chest:  Vascular: There is no evidence of an aortic dissection. There is no   central pulmonary embolism.    Lungs: A 3 mm nodule is seen in the right upper lobe (series 3, image   60). A 5 mm subpleural lymph node is seen in the left lower lobe. No   pulmonary masses or consolidations are seen. There is no evidence of a   pleural effusion.Dependent, subsegmental atelectasis is noted. Biapical   scarring and blebs are present.    Airways: The trachea and main bronchi are patent.    Mediastinum: There are no significant mediastinal, hilar or axillary   adenopathy. The heart size is within normal limits. There is no   pericardial effusion. Mild coronary artery calcifications are noted.    Miscellaneous: The visualized thyroid is unremarkable.     Abdomen:  Organs: Multiple gallstones are noted. Bilateral renal cysts are seen. A   left renal parapelvic cyst versus extra renal pelvis is noted. The liver,   spleen, pancreas and adrenal glands are unremarkable.    Gastrointestinal tract: There is no evidence of a bowel obstruction or   inflammation. The appendix is unremarkable.    Vascular: The patient is status post aortobiiliac endograft placement.    Pelvis: The urinary bladder, prostate and seminal vesicles are   unremarkable. Left inguinal surgical clips are noted.    Miscellaneous: There is no significant abdominal or pelvic   lymphadenopathy. No free air or free fluid is demonstrated.    Bones: Mild degenerative changes of the spine are seen.    IMPRESSION:  No evidence of an aortic dissection.            < end of copied text >    -  -  -  -
CHIEF COMPLAINT: Patient is a 58y old  Male who presents with a chief complaint of Chest pain at rest. (16 Jun 2018 06:10)    Midepigastric pressure, goes to left arm, lasts for hours, similar presentation 6 months ago, cardiac cath with nonobstructive disease  -MI, +HTN, - DM, + smoking, 2 PPD x 45 years, +hyperlipidemia, no family history, AAA stent, syncope      HPI:  Savannah Evans is a 58y Male complaining of chest pain.  Chest pain started about 2230 tonight while watching TV.  Similar episode day before for hours, but didn't seek medical attention.  Patient walked up 13 stairs, developed SOB and passed out for few seconds, he was unable to move his left leg while he was in the ambulance.  No back or leg pain. (16 Jun 2018 06:10)      PAST MEDICAL & SURGICAL HISTORY:  HTN (hypertension)  High cholesterol  H/O endovascular stent graft for abdominal aortic aneurysm      SOCIAL HISTORY:    FAMILY HISTORY: FAMILY HISTORY:  No pertinent family history in first degree relatives      MEDICATIONS  (STANDING):  aspirin enteric coated 325 milliGRAM(s) Oral daily  atorvastatin 40 milliGRAM(s) Oral at bedtime  heparin  Injectable 5000 Unit(s) SubCutaneous every 12 hours    MEDICATIONS  (PRN):      Allergies    No Known Allergies    Intolerances        REVIEW OF SYSTEMS:    CONSTITUTIONAL: No weakness, fevers or chills  EYES: No visual changes, No diplopia  ENMT: No throat pain , No exudate  NECK: No pain or stiffness  RESPIRATORY: No cough, wheezing, hemoptysis; No shortness of breath  CARDIOVASCULAR: No chest pain or palpitations  GASTROINTESTINAL: No abdominal pain. No nausea, vomiting, or hematemesis; No diarrhea or constipation. No melena or hematochezia.  GENITOURINARY: No dysuria, frequency or hematuria  NEUROLOGICAL: No numbness or weakness  SKIN: No itching or rash  All other review of systems is negative unless indicated above    VITAL SIGNS:   Vital Signs Last 24 Hrs  T(C): 36.6 (16 Jun 2018 12:06), Max: 36.8 (15 Johny 2018 23:32)  T(F): 97.8 (16 Jun 2018 12:06), Max: 98.3 (15 Johny 2018 23:32)  HR: 64 (16 Jun 2018 12:06) (61 - 81)  BP: 148/96 (16 Jun 2018 12:10) (114/80 - 184/81)  BP(mean): --  RR: 18 (16 Jun 2018 07:45) (15 - 20)  SpO2: 96% (16 Jun 2018 12:06) (96% - 100%)    I&O's Summary      PHYSICAL EXAM:    Constitutional: NAD, awake and alert, well-developed  Eyes:  EOMI,  Pupils round, no lesions  ENMT: no exudate or erythema  Pulmonary: Non-labored, breath sounds are clear bilaterally, No wheezing, rales or rhonchi  Cardiovascular: PMI not palpable non-displaced Regular S1 and S2, no murmurs, rubs, gallops or clicks  Gastrointestinal: Bowel Sounds present, soft, nontender.   Lymph: No peripheral edema. No cervical lymphadenopathy.  Neurological: Alert, no focal deficits  Skin: No rashes. Changes of chronic venous stasis. No cyanosis.  Psych:  Mood & affect appropriate Confused.    LABS: All Labs Reviewed:                        14.9   9.19  )-----------( 190      ( 15 Johny 2018 23:56 )             43.8     16 Jun 2018 14:00    142    |  105    |  9      ----------------------------<  84     3.9     |  31     |  0.90   15 Johny 2018 23:56    142    |  108    |  11     ----------------------------<  126    3.5     |  26     |  0.89     Ca    8.9        16 Jun 2018 14:00  Ca    8.6        15 Johny 2018 23:56    TPro  7.5    /  Alb  3.6    /  TBili  0.7    /  DBili  x      /  AST  18     /  ALT  22     /  AlkPhos  92     15 Johny 2018 23:56    PT/INR - ( 16 Jun 2018 00:20 )   PT: 11.1 sec;   INR: 1.02 ratio         PTT - ( 16 Jun 2018 00:20 )  PTT:30.6 sec  CARDIAC MARKERS ( 15 Johny 2018 23:56 )  <.015 ng/mL / x     / 101 U/L / x     / 0.7 ng/mL      Blood Culture:         RADIOLOGY/EKG:
Chief Complaint:  Patient is a 58y old  Male who presents with a chief complaint of Chest pain at rest. Savannah Evans is a 58y Male complaining of chest pain.  Chest pain started about 2230 tonight while watching TV.  Similar episode day before for hours, but didn't seek medical attention.  Patient walked up 13 stairs, developed SOB and passed out for few seconds, he was unable to move his left leg while he was in the ambulance.  No back or leg pain. pain occured 3 timesa fter eating a fatty meal no sob or chest pain is present he also had taken 3 advils a night for his back pain, to have an  upper gastrointestinal endoscopy no melena no brbpr, had a colonosocpy never had an  upper gastrointestinal endoscopy in the pasty  Allergies:  No Known Allergies      Medications:  aluminum hydroxide/magnesium hydroxide/simethicone Suspension 30 milliLiter(s) Oral every 4 hours PRN  aspirin enteric coated 325 milliGRAM(s) Oral daily  atorvastatin 40 milliGRAM(s) Oral at bedtime  heparin  Injectable 5000 Unit(s) SubCutaneous every 12 hours  pantoprazole  Injectable 40 milliGRAM(s) IV Push daily      PMHX/PSHX:  HTN (hypertension)  High cholesterol  H/O endovascular stent graft for abdominal aortic aneurysm      Family history:  No pertinent family history in first degree relatives      Social History: lives at home  no ivda no prbc    ROS:     General:  No wt loss, fevers, chills, night sweats, fatigue,   Eyes:  Good vision, no reported pain  ENT:  No sore throat, pain, runny nose, dysphagia  CV:  No pain, palpitations, hypo/hypertension  Resp:  No dyspnea, cough, tachypnea, wheezing  GI:  No pain, No nausea, No vomiting, No diarrhea, No constipation, No weight loss, No fever, No pruritis, No rectal bleeding, No tarry stools, No dysphagia,  :  No pain, bleeding, incontinence, nocturia  Muscle:  No pain, weakness  Neuro:  No weakness, tingling, memory problems  Psych:  No fatigue, insomnia, mood problems, depression  Endocrine:  No polyuria, polydipsia, cold/heat intolerance  Heme:  No petechiae, ecchymosis, easy bruisability  Skin:  No rash, tattoos, scars, edema      PHYSICAL EXAM:   Vital Signs:  Vital Signs Last 24 Hrs  T(C): 36.5 (2018 16:19), Max: 36.8 (15 Johny 2018 23:32)  T(F): 97.7 (2018 16:19), Max: 98.3 (15 Johny 2018 23:32)  HR: 60 (2018 16:19) (60 - 81)  BP: 117/80 (2018 16:19) (114/80 - 184/81)  BP(mean): --  RR: 18 (2018 16:19) (15 - 20)  SpO2: 98% (2018 16:19) (96% - 100%)  Daily Height in cm: 175.26 (15 Johny 2018 23:32)    Daily Weight in k.2 (2018 05:16)    GENERAL:  Appears stated age, well-groomed, well-nourished, no distress  HEENT:  NC/AT,  conjunctivae clear and pink, no thyromegaly, nodules, adenopathy, no JVD, sclera -anicteric  CHEST:  Full & symmetric excursion, no increased effort, breath sounds clear  HEART:  Regular rhythm, S1, S2, no murmur/rub/S3/S4, no abdominal bruit, no edema  ABDOMEN:  Soft, non-tender, non-distended, normoactive bowel sounds,  no masses ,no hepato-splenomegaly, no signs of chronic liver disease  EXTEREMITIES:  no cyanosis,clubbing or edema  SKIN:  No rash/erythema/ecchymoses/petechiae/wounds/abscess/warm/dry  NEURO:  Alert, oriented, no asterixis, no tremor, no encephalopathy    LABS:                        14.9   9.19  )-----------( 190      ( 15 Johny 2018 23:56 )             43.8     06-16    142  |  105  |  9   ----------------------------<  84  3.9   |  31  |  0.90    Ca    8.9      2018 14:00    TPro  7.5  /  Alb  3.6  /  TBili  0.7  /  DBili  x   /  AST  18  /  ALT  22  /  AlkPhos  92  06-15    LIVER FUNCTIONS - ( 15 Johny 2018 23:56 )  Alb: 3.6 g/dL / Pro: 7.5 g/dL / ALK PHOS: 92 U/L / ALT: 22 U/L / AST: 18 U/L / GGT: x           PT/INR - ( 2018 00:20 )   PT: 11.1 sec;   INR: 1.02 ratio         PTT - ( 2018 00:20 )  PTT:30.6 sec    Amylase Serum57      Lipase [id number]       Ammonia--      Imaging:

## 2018-06-17 NOTE — PROGRESS NOTE ADULT - ASSESSMENT
Midepigastic pain/Chest pain  cardiac enzymes x 3  echo  continue telemetry monitoring  GI workup in progress    Syncope  most likely vasovagal secondary to abdominal pain    cardiac enzymes/ECG negative    patient states he will signout today, and will pursue GI workup as an outpatient  He has a follow up with his cardiologist Dr Blum scheduled for July 1  advised him to talk to GI before leaving

## 2018-06-17 NOTE — CONSULT NOTE ADULT - ASSESSMENT
IMPRESSION acute cholecystitis
Midepigastic pain/Chest pain  cardiac enzymes x 3  echo  continue telemetry monitoring  GI workup in progress    Syncope  most likely vasovagal secondary to abdominal pain

## 2018-06-17 NOTE — PROGRESS NOTE ADULT - PROBLEM SELECTOR PLAN 1
hida scan  gerd precautions  in and out  ppi once a day  may need  upper gastrointestinal endoscopy in am  advance diet

## 2018-06-17 NOTE — PROGRESS NOTE ADULT - SUBJECTIVE AND OBJECTIVE BOX
CHIEF COMPLAINT: Patient is a 58y old  Male who presents with a chief complaint of Chest pain at rest. (16 Jun 2018 06:10)      Follow Up: [ ] Chest Pain      [ ] Dyspnea     [ ] Palpitations    [ ] Atrial Fibrillation     [ ] Ventricular Dysrhythmia    [ ] Abnormal EKG                      [ ] Abnormal Cardiac Enzymes     [ ] Valvular Disease   [ ] CAD  [ ] Hypertension [ ] CHF     HPI:  Savannah Evans is a 58y Male complaining of chest pain.  Chest pain started about 2230 tonight while watching TV.  Similar episode day before for hours, but didn't seek medical attention.  Patient walked up 13 stairs, developed SOB and passed out for few seconds, he was unable to move his left leg while he was in the ambulance.  No back or leg pain. (16 Jun 2018 06:10)      PAST MEDICAL & SURGICAL HISTORY:  HTN (hypertension)  High cholesterol  H/O endovascular stent graft for abdominal aortic aneurysm      MEDICATIONS  (STANDING):  aspirin enteric coated 81 milliGRAM(s) Oral daily  atorvastatin 40 milliGRAM(s) Oral at bedtime  heparin  Injectable 5000 Unit(s) SubCutaneous every 8 hours  pantoprazole  Injectable 40 milliGRAM(s) IV Push daily    MEDICATIONS  (PRN):  aluminum hydroxide/magnesium hydroxide/simethicone Suspension 30 milliLiter(s) Oral every 4 hours PRN Dyspepsia  zolpidem 5 milliGRAM(s) Oral at bedtime PRN Insomnia  zolpidem 5 milliGRAM(s) Oral at bedtime PRN Insomnia      Allergies    No Known Allergies    Intolerances        REVIEW OF SYSTEMS:    CONSTITUTIONAL: No weakness, fevers or chills.   EYES/ENT: No visual changes;  No vertigo or throat pain   NECK: No pain or stiffness  RESPIRATORY: No cough, wheezing, hemoptysis; No shortness of breath  CARDIOVASCULAR: No chest pain or palpitations  GASTROINTESTINAL: No abdominal or epigastric pain. No nausea, vomiting, or hematemesis; No diarrhea or constipation. No melena or hematochezia.  GENITOURINARY: No dysuria, frequency or hematuria  NEUROLOGICAL: No numbness or weakness  SKIN: No itching, burning, rashes, or lesions   All other review of systems is negative unless indicated above    Vital Signs Last 24 Hrs  T(C): 35.9 (17 Jun 2018 08:41), Max: 36.8 (16 Jun 2018 20:20)  T(F): 96.6 (17 Jun 2018 08:41), Max: 98.2 (16 Jun 2018 20:20)  HR: 98 (17 Jun 2018 09:31) (60 - 98)  BP: 138/84 (17 Jun 2018 08:41) (117/80 - 151/83)  BP(mean): --  RR: 18 (17 Jun 2018 08:41) (18 - 118)  SpO2: 99% (17 Jun 2018 09:31) (95% - 99%)    I&O's Summary      PHYSICAL EXAM:    Constitutional: NAD, awake and alert, well-developed  Eyes:  EOMI,  Pupils round, No oral cyanosis.  ENMT: No exudate or erythema  Pulmonary: Non-labored, breath sounds are clear bilaterally, No wheezing, rales or rhonchi  Cardiovascular: Regular, S1 and S2, No murmurs, rubs, gallops oir clicks  Gastrointestinal: Bowel Sounds present, soft, nontender.   Lymph: No peripheral edema. No lymphadenopathy.  Neurological: Alert, no focal deficits  Skin: No rashes.  Psych:  Mood & affect appropriate    LABS: All Labs Reviewed:                        14.9   x     )-----------( x        ( 17 Jun 2018 06:24 )             44.4                         15.2   9.70  )-----------( 194      ( 16 Jun 2018 20:50 )             43.8                         14.9   9.19  )-----------( 190      ( 15 Johny 2018 23:56 )             43.8     16 Jun 2018 14:00    142    |  105    |  9      ----------------------------<  84     3.9     |  31     |  0.90   15 Johny 2018 23:56    142    |  108    |  11     ----------------------------<  126    3.5     |  26     |  0.89     Ca    8.9        16 Jun 2018 14:00  Ca    8.6        15 Johny 2018 23:56    TPro  7.5    /  Alb  3.6    /  TBili  0.7    /  DBili  x      /  AST  18     /  ALT  22     /  AlkPhos  92     15 Johny 2018 23:56    PT/INR - ( 16 Jun 2018 00:20 )   PT: 11.1 sec;   INR: 1.02 ratio         PTT - ( 16 Jun 2018 00:20 )  PTT:30.6 sec  CARDIAC MARKERS ( 15 Johny 2018 23:56 )  <.015 ng/mL / x     / 101 U/L / x     / 0.7 ng/mL      Blood Culture:         RADIOLOGY/EKG:

## 2018-06-17 NOTE — PROGRESS NOTE ADULT - SUBJECTIVE AND OBJECTIVE BOX
Patient is a 58y old  Male who presents with a chief complaint of Chest pain at rest. (16 Jun 2018 06:10)      INTERVAL /OVERNIGHT EVENTS: c/o RUQ pain now    MEDICATIONS  (STANDING):  aspirin enteric coated 81 milliGRAM(s) Oral daily  atorvastatin 40 milliGRAM(s) Oral at bedtime  ceFAZolin   IVPB 2000 milliGRAM(s) IV Intermittent once  heparin  Injectable 5000 Unit(s) SubCutaneous every 8 hours  pantoprazole  Injectable 40 milliGRAM(s) IV Push daily    MEDICATIONS  (PRN):  aluminum hydroxide/magnesium hydroxide/simethicone Suspension 30 milliLiter(s) Oral every 4 hours PRN Dyspepsia  zolpidem 5 milliGRAM(s) Oral at bedtime PRN Insomnia  zolpidem 5 milliGRAM(s) Oral at bedtime PRN Insomnia      Allergies    No Known Allergies    Intolerances        REVIEW OF SYSTEMS:  CONSTITUTIONAL: No fever, weight loss, or fatigue  EYES: No eye pain, visual disturbances, or discharge  ENMT:  No difficulty hearing, tinnitus, vertigo; No sinus or throat pain  NECK: No pain or stiffness  RESPIRATORY: No cough, wheezing, chills or hemoptysis; No shortness of breath  CARDIOVASCULAR: No chest pain, palpitations, dizziness, or leg swelling  GASTROINTESTINAL: + abdominal or epigastric pain. No nausea, vomiting, or hematemesis; No diarrhea or constipation. No melena or hematochezia.  GENITOURINARY: No dysuria, frequency, hematuria, or incontinence  NEUROLOGICAL: No headaches, memory loss, loss of strength, numbness, or tremors  SKIN: No itching, burning, rashes, or lesions   LYMPH NODES: No enlarged glands  ENDOCRINE: No heat or cold intolerance; No hair loss; No polydipsia or polyuria  MUSCULOSKELETAL: No joint pain or swelling; No muscle, back, or extremity pain  PSYCHIATRIC: No depression, anxiety, mood swings, or difficulty sleeping  HEME/LYMPH: No easy bruising, or bleeding gums  ALLERGY AND IMMUNOLOGIC: No hives or eczema    Vital Signs Last 24 Hrs  T(C): 36.7 (17 Jun 2018 15:38), Max: 36.8 (16 Jun 2018 20:20)  T(F): 98.1 (17 Jun 2018 15:38), Max: 98.3 (17 Jun 2018 11:21)  HR: 65 (17 Jun 2018 15:38) (65 - 98)  BP: 137/82 (17 Jun 2018 15:38) (122/78 - 151/83)  BP(mean): --  RR: 15 (17 Jun 2018 15:38) (15 - 118)  SpO2: 97% (17 Jun 2018 15:38) (95% - 99%)    PHYSICAL EXAM:  GENERAL: NAD, well-groomed, well-developed  HEAD:  Atraumatic, Normocephalic  EYES: EOMI, PERRLA, conjunctiva and sclera clear  ENMT: No tonsillar erythema, exudates, or enlargement; Moist mucous membranes, Good dentition, No lesions  NECK: Supple, No JVD, Normal thyroid  NERVOUS SYSTEM:  Alert & Oriented X3, Good concentration; Motor Strength 5/5 B/L upper and lower extremities; DTRs 2+ intact and symmetric  CHEST/LUNG: Clear to auscultation bilaterally; No rales, rhonchi, wheezing, or rubs  HEART: Regular rate and rhythm; No murmurs, rubs, or gallops  ABDOMEN: Soft, Nontender, Nondistended; Bowel sounds present  EXTREMITIES:  2+ Peripheral Pulses, No clubbing, cyanosis, or edema  LYMPH: No lymphadenopathy noted  SKIN: No rashes or lesions    LABS:                        14.9   x     )-----------( x        ( 17 Jun 2018 06:24 )             44.4       Ca    8.9        16 Jun 2018 14:00      PT/INR - ( 16 Jun 2018 00:20 )   PT: 11.1 sec;   INR: 1.02 ratio         PTT - ( 16 Jun 2018 00:20 )  PTT:30.6 sec    CAPILLARY BLOOD GLUCOSE          RADIOLOGY & ADDITIONAL TESTS:    Notes Reviewed:  [x ] YES  [ ] NO    Care Discussed with Consultants/Other Providers [x ] YES  [ ] NO

## 2018-06-17 NOTE — PROGRESS NOTE ADULT - ATTENDING COMMENTS
Advanced care planning was discussed with patient and family.  Advanced care planning forms were reviewed and discussed.  Risks, benefits and alternatives of gastroenterologic procedures were discussed in detail and all questions were answered.    30 minutes spent.
patient is as medically optimal as possible for procedure

## 2018-06-17 NOTE — CONSULT NOTE ADULT - PROBLEM SELECTOR RECOMMENDATION 9
recommend lap cholecystectomy
gerd precautions  in and out  ppi once a day  may need  upper gastrointestinal endoscopy on monday if hida scan is negative  ppi once a day  plan d/w patient and he is aware of the plan  advance diet

## 2018-06-17 NOTE — PROGRESS NOTE ADULT - SUBJECTIVE AND OBJECTIVE BOX
Neurology follow up note    YUDELKA [id number]      Interval History:    Patient feels ok no new complaints.    MEDICATIONS    aluminum hydroxide/magnesium hydroxide/simethicone Suspension 30 milliLiter(s) Oral every 4 hours PRN  aspirin enteric coated 81 milliGRAM(s) Oral daily  atorvastatin 40 milliGRAM(s) Oral at bedtime  ceFAZolin   IVPB 2000 milliGRAM(s) IV Intermittent once  heparin  Injectable 5000 Unit(s) SubCutaneous every 8 hours  pantoprazole  Injectable 40 milliGRAM(s) IV Push daily  zolpidem 5 milliGRAM(s) Oral at bedtime PRN  zolpidem 5 milliGRAM(s) Oral at bedtime PRN      Allergies    No Known Allergies    Intolerances          Weight (kg): 108.9 (06-17 @ 05:39)    Vital Signs Last 24 Hrs  T(C): 36.8 (17 Jun 2018 11:21), Max: 36.8 (16 Jun 2018 20:20)  T(F): 98.3 (17 Jun 2018 11:21), Max: 98.3 (17 Jun 2018 11:21)  HR: 68 (17 Jun 2018 11:21) (60 - 98)  BP: 130/83 (17 Jun 2018 11:21) (117/80 - 151/83)  BP(mean): --  RR: 16 (17 Jun 2018 11:21) (16 - 118)  SpO2: 96% (17 Jun 2018 11:21) (95% - 99%)    REVIEW OF SYSTEM:   Constitutional:  No fever, chills, or night sweats.    Head:  No headache.    Eyes:  No double vision or blurry vision.   Ears:  No ringing in the ears.    Neck:  No neck pain.    Respiratory:  No shortness of breath.    Cardiovascular:  No chest pain.    Abdomen:  No nausea, vomiting, or abdominal pain.    Extremities/Neurological:  No numbness or tingling.    Musculoskeletal:  Positive left knee pain.    PHYSICAL EXAMINATION:    HEENT:  Head:  Normocephalic, atraumatic.    Eyes:  No scleral icterus.    Ears:  Hearing bilaterally intact.   NECK:  Supple.  RESPIRATORY:  Good air entry bilaterally.    CARDIOVASCULAR:  S1 and S2 heard.    ABDOMEN:  Soft and nontender.    EXTREMITIES:  No clubbing or cyanosis were noted.      NEUROLOGIC:  The patient is awake, alert, and oriented x3.     Extraocular movements are intact.  Pupils were equal, round, and reactive bilaterally 3 mm to 2 mm.     Speech was fluent.  Smile was symmetric.      Motor:  Bilateral upper and lower were 5/5.      Sensory:  Bilateral upper and lower intact to light touch.  No drift.  Finger-to-nose within normal limits.      LABS:  CBC Full  -  ( 17 Jun 2018 06:24 )  WBC Count : x  Hemoglobin : 14.9 g/dL  Hematocrit : 44.4 %  Platelet Count - Automated : x  Mean Cell Volume : x  Mean Cell Hemoglobin : x  Mean Cell Hemoglobin Concentration : x  Auto Neutrophil # : x  Auto Lymphocyte # : x  Auto Monocyte # : x  Auto Eosinophil # : x  Auto Basophil # : x  Auto Neutrophil % : x  Auto Lymphocyte % : x  Auto Monocyte % : x  Auto Eosinophil % : x  Auto Basophil % : x      06-16    142  |  105  |  9   ----------------------------<  84  3.9   |  31  |  0.90    Ca    8.9      16 Jun 2018 14:00    TPro  7.5  /  Alb  3.6  /  TBili  0.7  /  DBili  x   /  AST  18  /  ALT  22  /  AlkPhos  92  06-15    Hemoglobin A1C:   Lipid Panel 06-17 @ 09:59  Total Cholesterol, Serum 173    Triglycerides 217    LIVER FUNCTIONS - ( 15 Johny 2018 23:56 )  Alb: 3.6 g/dL / Pro: 7.5 g/dL / ALK PHOS: 92 U/L / ALT: 22 U/L / AST: 18 U/L / GGT: x           Vitamin B12   PT/INR - ( 16 Jun 2018 00:20 )   PT: 11.1 sec;   INR: 1.02 ratio         PTT - ( 16 Jun 2018 00:20 )  PTT:30.6 sec      RADIOLOGY    ANALYSIS AND PLAN:  This is a 58-year-old with an episode of chest pain, loss of consciousness and the left leg weakness now resolved.  1.	Clinical impression most likely the syncopal event, possible vasovagal pain mediated .  MRI negative for central lesions   2.	for surgery   3.	neurologic wise stable   4.	I would recommend telemetry evaluation.    Thank you for the courtesy of this consultation.

## 2018-06-18 ENCOUNTER — TRANSCRIPTION ENCOUNTER (OUTPATIENT)
Age: 58
End: 2018-06-18

## 2018-06-18 ENCOUNTER — RESULT REVIEW (OUTPATIENT)
Age: 58
End: 2018-06-18

## 2018-06-18 VITALS
DIASTOLIC BLOOD PRESSURE: 95 MMHG | RESPIRATION RATE: 18 BRPM | HEART RATE: 80 BPM | TEMPERATURE: 98 F | SYSTOLIC BLOOD PRESSURE: 149 MMHG | OXYGEN SATURATION: 94 %

## 2018-06-18 LAB
ALBUMIN SERPL ELPH-MCNC: 3.5 G/DL — SIGNIFICANT CHANGE UP (ref 3.3–5)
ALP SERPL-CCNC: 76 U/L — SIGNIFICANT CHANGE UP (ref 40–120)
ALT FLD-CCNC: 21 U/L — SIGNIFICANT CHANGE UP (ref 12–78)
ANION GAP SERPL CALC-SCNC: 9 MMOL/L — SIGNIFICANT CHANGE UP (ref 5–17)
AST SERPL-CCNC: 18 U/L — SIGNIFICANT CHANGE UP (ref 15–37)
BILIRUB SERPL-MCNC: 1.3 MG/DL — HIGH (ref 0.2–1.2)
BUN SERPL-MCNC: 10 MG/DL — SIGNIFICANT CHANGE UP (ref 7–23)
CALCIUM SERPL-MCNC: 9 MG/DL — SIGNIFICANT CHANGE UP (ref 8.5–10.1)
CHLORIDE SERPL-SCNC: 105 MMOL/L — SIGNIFICANT CHANGE UP (ref 96–108)
CO2 SERPL-SCNC: 27 MMOL/L — SIGNIFICANT CHANGE UP (ref 22–31)
CREAT SERPL-MCNC: 1 MG/DL — SIGNIFICANT CHANGE UP (ref 0.5–1.3)
GLUCOSE SERPL-MCNC: 105 MG/DL — HIGH (ref 70–99)
HCT VFR BLD CALC: 46.2 % — SIGNIFICANT CHANGE UP (ref 39–50)
HGB BLD-MCNC: 15.5 G/DL — SIGNIFICANT CHANGE UP (ref 13–17)
MCHC RBC-ENTMCNC: 30.3 PG — SIGNIFICANT CHANGE UP (ref 27–34)
MCHC RBC-ENTMCNC: 33.5 GM/DL — SIGNIFICANT CHANGE UP (ref 32–36)
MCV RBC AUTO: 90.4 FL — SIGNIFICANT CHANGE UP (ref 80–100)
NRBC # BLD: 0 /100 WBCS — SIGNIFICANT CHANGE UP (ref 0–0)
PLATELET # BLD AUTO: 199 K/UL — SIGNIFICANT CHANGE UP (ref 150–400)
POTASSIUM SERPL-MCNC: 3.9 MMOL/L — SIGNIFICANT CHANGE UP (ref 3.5–5.3)
POTASSIUM SERPL-SCNC: 3.9 MMOL/L — SIGNIFICANT CHANGE UP (ref 3.5–5.3)
PROT SERPL-MCNC: 7.4 G/DL — SIGNIFICANT CHANGE UP (ref 6–8.3)
RBC # BLD: 5.11 M/UL — SIGNIFICANT CHANGE UP (ref 4.2–5.8)
RBC # FLD: 12.1 % — SIGNIFICANT CHANGE UP (ref 10.3–14.5)
SODIUM SERPL-SCNC: 141 MMOL/L — SIGNIFICANT CHANGE UP (ref 135–145)
WBC # BLD: 7.76 K/UL — SIGNIFICANT CHANGE UP (ref 3.8–10.5)
WBC # FLD AUTO: 7.76 K/UL — SIGNIFICANT CHANGE UP (ref 3.8–10.5)

## 2018-06-18 PROCEDURE — 80048 BASIC METABOLIC PNL TOTAL CA: CPT

## 2018-06-18 PROCEDURE — 74175 CTA ABDOMEN W/CONTRAST: CPT

## 2018-06-18 PROCEDURE — 71045 X-RAY EXAM CHEST 1 VIEW: CPT

## 2018-06-18 PROCEDURE — 82550 ASSAY OF CK (CPK): CPT

## 2018-06-18 PROCEDURE — 47562 LAPAROSCOPIC CHOLECYSTECTOMY: CPT | Mod: AS

## 2018-06-18 PROCEDURE — 90471 IMMUNIZATION ADMIN: CPT

## 2018-06-18 PROCEDURE — 93005 ELECTROCARDIOGRAM TRACING: CPT

## 2018-06-18 PROCEDURE — 76705 ECHO EXAM OF ABDOMEN: CPT

## 2018-06-18 PROCEDURE — 70551 MRI BRAIN STEM W/O DYE: CPT

## 2018-06-18 PROCEDURE — 86850 RBC ANTIBODY SCREEN: CPT

## 2018-06-18 PROCEDURE — 86900 BLOOD TYPING SEROLOGIC ABO: CPT

## 2018-06-18 PROCEDURE — 83690 ASSAY OF LIPASE: CPT

## 2018-06-18 PROCEDURE — 85027 COMPLETE CBC AUTOMATED: CPT

## 2018-06-18 PROCEDURE — 86901 BLOOD TYPING SEROLOGIC RH(D): CPT

## 2018-06-18 PROCEDURE — 71275 CT ANGIOGRAPHY CHEST: CPT

## 2018-06-18 PROCEDURE — 70498 CT ANGIOGRAPHY NECK: CPT

## 2018-06-18 PROCEDURE — 88304 TISSUE EXAM BY PATHOLOGIST: CPT | Mod: 26

## 2018-06-18 PROCEDURE — 82150 ASSAY OF AMYLASE: CPT

## 2018-06-18 PROCEDURE — 85730 THROMBOPLASTIN TIME PARTIAL: CPT

## 2018-06-18 PROCEDURE — 99285 EMERGENCY DEPT VISIT HI MDM: CPT | Mod: 25

## 2018-06-18 PROCEDURE — 36415 COLL VENOUS BLD VENIPUNCTURE: CPT

## 2018-06-18 PROCEDURE — 93306 TTE W/DOPPLER COMPLETE: CPT

## 2018-06-18 PROCEDURE — 80061 LIPID PANEL: CPT

## 2018-06-18 PROCEDURE — 83036 HEMOGLOBIN GLYCOSYLATED A1C: CPT

## 2018-06-18 PROCEDURE — 85014 HEMATOCRIT: CPT

## 2018-06-18 PROCEDURE — 97161 PT EVAL LOW COMPLEX 20 MIN: CPT

## 2018-06-18 PROCEDURE — 82553 CREATINE MB FRACTION: CPT

## 2018-06-18 PROCEDURE — 85018 HEMOGLOBIN: CPT

## 2018-06-18 PROCEDURE — 84484 ASSAY OF TROPONIN QUANT: CPT

## 2018-06-18 PROCEDURE — 88304 TISSUE EXAM BY PATHOLOGIST: CPT

## 2018-06-18 PROCEDURE — 70450 CT HEAD/BRAIN W/O DYE: CPT

## 2018-06-18 PROCEDURE — 85610 PROTHROMBIN TIME: CPT

## 2018-06-18 PROCEDURE — 70496 CT ANGIOGRAPHY HEAD: CPT

## 2018-06-18 PROCEDURE — 97165 OT EVAL LOW COMPLEX 30 MIN: CPT

## 2018-06-18 PROCEDURE — 80053 COMPREHEN METABOLIC PANEL: CPT

## 2018-06-18 RX ORDER — ACETAMINOPHEN 500 MG
1000 TABLET ORAL ONCE
Qty: 0 | Refills: 0 | Status: COMPLETED | OUTPATIENT
Start: 2018-06-18 | End: 2018-06-18

## 2018-06-18 RX ORDER — METOCLOPRAMIDE HCL 10 MG
10 TABLET ORAL ONCE
Qty: 0 | Refills: 0 | Status: DISCONTINUED | OUTPATIENT
Start: 2018-06-18 | End: 2018-06-18

## 2018-06-18 RX ORDER — OXYCODONE HYDROCHLORIDE 5 MG/1
5 TABLET ORAL ONCE
Qty: 0 | Refills: 0 | Status: DISCONTINUED | OUTPATIENT
Start: 2018-06-18 | End: 2018-06-18

## 2018-06-18 RX ORDER — SODIUM CHLORIDE 9 MG/ML
1000 INJECTION, SOLUTION INTRAVENOUS
Qty: 0 | Refills: 0 | Status: DISCONTINUED | OUTPATIENT
Start: 2018-06-18 | End: 2018-06-18

## 2018-06-18 RX ORDER — HYDROMORPHONE HYDROCHLORIDE 2 MG/ML
0.5 INJECTION INTRAMUSCULAR; INTRAVENOUS; SUBCUTANEOUS
Qty: 0 | Refills: 0 | Status: DISCONTINUED | OUTPATIENT
Start: 2018-06-18 | End: 2018-06-18

## 2018-06-18 RX ORDER — CEPHALEXIN 500 MG
1 CAPSULE ORAL
Qty: 2 | Refills: 0
Start: 2018-06-18 | End: 2018-06-18

## 2018-06-18 RX ADMIN — PANTOPRAZOLE SODIUM 40 MILLIGRAM(S): 20 TABLET, DELAYED RELEASE ORAL at 12:08

## 2018-06-18 RX ADMIN — SODIUM CHLORIDE 125 MILLILITER(S): 9 INJECTION, SOLUTION INTRAVENOUS at 16:19

## 2018-06-18 RX ADMIN — Medication 400 MILLIGRAM(S): at 16:12

## 2018-06-18 NOTE — DISCHARGE NOTE ADULT - MEDICATION SUMMARY - MEDICATIONS TO TAKE
I will START or STAY ON the medications listed below when I get home from the hospital:    Percocet 5/325 oral tablet  -- 1 tab(s) by mouth every 6 hours, As Needed -for moderate pain MDD:6 tabs   -- Caution federal law prohibits the transfer of this drug to any person other  than the person for whom it was prescribed.  May cause drowsiness.  Alcohol may intensify this effect.  Use care when operating dangerous machinery.  This prescription cannot be refilled.  This product contains acetaminophen.  Do not use  with any other product containing acetaminophen to prevent possible liver damage.  Using more of this medication than prescribed may cause serious breathing problems.    -- Indication: For pain meds    Keflex 500 mg oral capsule  -- 1 cap(s) by mouth every 12 hours   -- Finish all this medication unless otherwise directed by prescriber.    -- Indication: For antibiotics I will START or STAY ON the medications listed below when I get home from the hospital:    oxyCODONE-acetaminophen 5 mg-325 mg oral tablet  -- 1-2  tab(s) by mouth every 6 hours, As Needed MDD:6   -- Caution federal law prohibits the transfer of this drug to any person other  than the person for whom it was prescribed.  May cause drowsiness.  Alcohol may intensify this effect.  Use care when operating dangerous machinery.  This prescription cannot be refilled.  This product contains acetaminophen.  Do not use  with any other product containing acetaminophen to prevent possible liver damage.  Using more of this medication than prescribed may cause serious breathing problems.    -- Indication: For pain control    Keflex 500 mg oral capsule  -- 1 cap(s) by mouth every 12 hours   -- Finish all this medication unless otherwise directed by prescriber.    -- Indication: For antibiotics

## 2018-06-18 NOTE — DISCHARGE NOTE ADULT - CARE PLAN
Principal Discharge DX:	Calculus of gallbladder with biliary obstruction but without cholecystitis  Goal:	full recovery  Assessment and plan of treatment:	low fat diet  follow up one week in office. Please call for appointment [phone].

## 2018-06-18 NOTE — PROGRESS NOTE ADULT - PROBLEM SELECTOR PLAN 1
labs noted  for OR today for lap jacob, currently npo  cont ppi qd, maalox q4 hrs prn  gerd precautions  will eventually need egd inpt vs outpt labs noted  for OR today for lap jacob, currently npo/IVF as per primary team, post op diet as per sx  cont ppi qd, maalox q4 hrs prn  gerd precautions  may need eventual egd, inpt vs outpt, d/w pt and agreeable  will follow

## 2018-06-18 NOTE — OCCUPATIONAL THERAPY INITIAL EVALUATION ADULT - PERTINENT HX OF CURRENT PROBLEM, REHAB EVAL
57 y/o male admitted with chest pain, syncope and possible CVA. Chest pain due to myocardial ischemia. CT angio brain and neck 6/16/18 (-) and no acute arterial findings.

## 2018-06-18 NOTE — DISCHARGE NOTE ADULT - HOSPITAL COURSE
Pt was admitted with chest  pain but his cardiac workup was negative including a recently done angiogram at [facility].  He was found to have RUQ pain and gallstones.  He was taken to the operating room on  6/18/18 where he had an uneventful laparoscopic cholecystectomy.  He did well and was discharged later the same day.  He was sent home on a low fat diet with plans to follow up in the office in one week.

## 2018-06-18 NOTE — PROGRESS NOTE ADULT - SUBJECTIVE AND OBJECTIVE BOX
Neurology follow up note    YUDELKA [id number]      Interval History:    Patient feels ok no new complaints seen with spouse     MEDICATIONS    aluminum hydroxide/magnesium hydroxide/simethicone Suspension 30 milliLiter(s) Oral every 4 hours PRN  aspirin enteric coated 81 milliGRAM(s) Oral daily  atorvastatin 40 milliGRAM(s) Oral at bedtime  pantoprazole  Injectable 40 milliGRAM(s) IV Push daily  zolpidem 5 milliGRAM(s) Oral at bedtime PRN  zolpidem 5 milliGRAM(s) Oral at bedtime PRN      Allergies    No Known Allergies    Intolerances        Height (cm): 175.26 (06-18 @ 12:52)  Weight (kg): 106.1 (06-18 @ 12:52)  BMI (kg/m2): 34.5 (06-18 @ 12:52)    Vital Signs Last 24 Hrs  T(C): 36.7 (18 Jun 2018 12:52), Max: 36.8 (17 Jun 2018 19:40)  T(F): 98.1 (18 Jun 2018 12:52), Max: 98.2 (17 Jun 2018 19:40)  HR: 79 (18 Jun 2018 12:52) (65 - 79)  BP: 164/95 (18 Jun 2018 12:52) (129/64 - 164/95)  BP(mean): --  RR: 16 (18 Jun 2018 12:52) (15 - 18)  SpO2: 96% (18 Jun 2018 12:52) (94% - 97%)    REVIEW OF SYSTEM:   Constitutional:  No fever, chills, or night sweats.    Head:  No headache.    Eyes:  No double vision or blurry vision.   Ears:  No ringing in the ears.    Neck:  No neck pain.    Respiratory:  No shortness of breath.    Cardiovascular:  No chest pain.    Abdomen:  No nausea, vomiting, or abdominal pain.    Extremities/Neurological:  No numbness or tingling.    Musculoskeletal:  Positive left knee pain.    PHYSICAL EXAMINATION:    HEENT:  Head:  Normocephalic, atraumatic.    Eyes:  No scleral icterus.    Ears:  Hearing bilaterally intact.   NECK:  Supple.  RESPIRATORY:  Good air entry bilaterally.    CARDIOVASCULAR:  S1 and S2 heard.    ABDOMEN:  Soft and nontender.    EXTREMITIES:  No clubbing or cyanosis were noted.      NEUROLOGIC:  The patient is awake, alert, and oriented x3.     Extraocular movements are intact.  Pupils were equal, round, and reactive bilaterally 3 mm to 2 mm.     Speech was fluent.  Smile was symmetric.      Motor:  Bilateral upper and lower were 5/5.      Sensory:  Bilateral upper and lower intact to light touch.  No drift.  Finger-to-nose within normal limits.              LABS:  CBC Full  -  ( 18 Jun 2018 07:24 )  WBC Count : 7.76 K/uL  Hemoglobin : 15.5 g/dL  Hematocrit : 46.2 %  Platelet Count - Automated : 199 K/uL  Mean Cell Volume : 90.4 fl  Mean Cell Hemoglobin : 30.3 pg  Mean Cell Hemoglobin Concentration : 33.5 gm/dL  Auto Neutrophil # : x  Auto Lymphocyte # : x  Auto Monocyte # : x  Auto Eosinophil # : x  Auto Basophil # : x  Auto Neutrophil % : x  Auto Lymphocyte % : x  Auto Monocyte % : x  Auto Eosinophil % : x  Auto Basophil % : x      06-18    141  |  105  |  10  ----------------------------<  105<H>  3.9   |  27  |  1.00    Ca    9.0      18 Jun 2018 07:24    TPro  7.4  /  Alb  3.5  /  TBili  1.3<H>  /  DBili  x   /  AST  18  /  ALT  21  /  AlkPhos  76  06-18    Hemoglobin A1C:     LIVER FUNCTIONS - ( 18 Jun 2018 07:24 )  Alb: 3.5 g/dL / Pro: 7.4 g/dL / ALK PHOS: 76 U/L / ALT: 21 U/L / AST: 18 U/L / GGT: x           Vitamin B12         RADIOLOGY    ANALYSIS AND PLAN:  This is a 58-year-old with an episode of chest pain, loss of consciousness and the left leg weakness now resolved.  1.	Clinical impression most likely the syncopal event, possible vasovagal pain mediated .  MRI negative for central lesions   2.	for surgery cleared by neurology for any procedure needed avoid hypotension if possible   3.	neurologic wise stable   4.	I would recommend telemetry evaluation.  5.	spoke to patient and spouse in detail     Thank you for the courtesy of this consultation.

## 2018-06-18 NOTE — PROGRESS NOTE ADULT - SUBJECTIVE AND OBJECTIVE BOX
INTERVAL HPI/OVERNIGHT EVENTS:  pt seen and examined    going for lap jacob today  labs noted afebrile overnight    MEDICATIONS  (STANDING):  aspirin enteric coated 81 milliGRAM(s) Oral daily  atorvastatin 40 milliGRAM(s) Oral at bedtime  ceFAZolin   IVPB 2000 milliGRAM(s) IV Intermittent once  pantoprazole  Injectable 40 milliGRAM(s) IV Push daily    MEDICATIONS  (PRN):  aluminum hydroxide/magnesium hydroxide/simethicone Suspension 30 milliLiter(s) Oral every 4 hours PRN Dyspepsia  zolpidem 5 milliGRAM(s) Oral at bedtime PRN Insomnia  zolpidem 5 milliGRAM(s) Oral at bedtime PRN Insomnia      Allergies    No Known Allergies    Intolerances        Review of Systems:    General:  No wt loss, fevers, chills, night sweats, fatigue   Eyes:  Good vision, no reported pain  ENT:  No sore throat, pain, runny nose, dysphagia  CV:  No pain, palpitations, hypo/hypertension  Resp:  No dyspnea, cough, tachypnea, wheezing  GI:  No pain, No nausea, No vomiting, No diarrhea, No constipation, No weight loss, No fever, No pruritis, No rectal bleeding, No melena, No dysphagia  :  No pain, bleeding, incontinence, nocturia  Muscle:  No pain, weakness  Neuro:  No weakness, tingling, memory problems  Psych:  No fatigue, insomnia, mood problems, depression  Endocrine:  No polyuria, polydypsia, cold/heat intolerance  Heme:  No petechiae, ecchymosis, easy bruisability  Skin:  No rash, tattoos, scars, edema      Vital Signs Last 24 Hrs  T(C): 36.4 (18 Jun 2018 07:57), Max: 36.8 (17 Jun 2018 11:21)  T(F): 97.6 (18 Jun 2018 07:57), Max: 98.3 (17 Jun 2018 11:21)  HR: 67 (18 Jun 2018 07:57) (65 - 98)  BP: 130/81 (18 Jun 2018 07:57) (129/64 - 148/92)  BP(mean): --  RR: 18 (18 Jun 2018 07:57) (15 - 18)  SpO2: 94% (18 Jun 2018 07:57) (94% - 99%)    PHYSICAL EXAM:    Constitutional: NAD  HEENT: EOMI, throat clear  Neck: No LAD, supple  Respiratory: CTA and P  Cardiovascular: S1 and S2, RRR, no M  Gastrointestinal: BS+, soft, NT/ND, neg HSM,  Extremities: No peripheral edema, neg clubbing, cyanosis  Vascular: 2+ peripheral pulses  Neurological: A/O x 3, no focal deficits  Psychiatric: Normal mood, normal affect  Skin: No rashes      LABS:                        15.5   7.76  )-----------( 199      ( 18 Jun 2018 07:24 )             46.2     06-18    141  |  105  |  10  ----------------------------<  105<H>  3.9   |  27  |  1.00    Ca    9.0      18 Jun 2018 07:24    TPro  7.4  /  Alb  3.5  /  TBili  1.3<H>  /  DBili  x   /  AST  18  /  ALT  21  /  AlkPhos  76  06-18          RADIOLOGY & ADDITIONAL TESTS: INTERVAL HPI/OVERNIGHT EVENTS:  pt seen and examined family at bedside  going for lap jacob today  denies n/v/d, c/o mild abd pain, states pain is improved  +bm this am  labs noted afebrile overnight    MEDICATIONS  (STANDING):  aspirin enteric coated 81 milliGRAM(s) Oral daily  atorvastatin 40 milliGRAM(s) Oral at bedtime  ceFAZolin   IVPB 2000 milliGRAM(s) IV Intermittent once  pantoprazole  Injectable 40 milliGRAM(s) IV Push daily    MEDICATIONS  (PRN):  aluminum hydroxide/magnesium hydroxide/simethicone Suspension 30 milliLiter(s) Oral every 4 hours PRN Dyspepsia  zolpidem 5 milliGRAM(s) Oral at bedtime PRN Insomnia  zolpidem 5 milliGRAM(s) Oral at bedtime PRN Insomnia      Allergies    No Known Allergies    Intolerances        Review of Systems:    General:  No wt loss, fevers, chills, night sweats, fatigue   Eyes:  Good vision, no reported pain  ENT:  No sore throat, pain, runny nose, dysphagia  CV:  No pain, palpitations, hypo/hypertension  Resp:  No dyspnea, cough, tachypnea, wheezing  GI:  +pain, No nausea, No vomiting, No diarrhea, No constipation, No weight loss, No fever, No pruritis, No rectal bleeding, No melena, No dysphagia  :  No pain, bleeding, incontinence, nocturia  Muscle:  No pain, weakness  Neuro:  No weakness, tingling, memory problems  Psych:  No fatigue, insomnia, mood problems, depression  Endocrine:  No polyuria, polydypsia, cold/heat intolerance  Heme:  No petechiae, ecchymosis, easy bruisability  Skin:  No rash, tattoos, scars, edema      Vital Signs Last 24 Hrs  T(C): 36.4 (18 Jun 2018 07:57), Max: 36.8 (17 Jun 2018 11:21)  T(F): 97.6 (18 Jun 2018 07:57), Max: 98.3 (17 Jun 2018 11:21)  HR: 67 (18 Jun 2018 07:57) (65 - 98)  BP: 130/81 (18 Jun 2018 07:57) (129/64 - 148/92)  BP(mean): --  RR: 18 (18 Jun 2018 07:57) (15 - 18)  SpO2: 94% (18 Jun 2018 07:57) (94% - 99%)    PHYSICAL EXAM:    Constitutional: NAD  HEENT: EOMI, throat clear  Neck: No LAD, supple  Respiratory: CTA and P  Cardiovascular: S1 and S2, RRR, no M  Gastrointestinal: BS+, soft, NT/ND, neg HSM,  Extremities: No peripheral edema, neg clubbing, cyanosis  Vascular: 2+ peripheral pulses  Neurological: A/O x 3, no focal deficits  Psychiatric: Normal mood, normal affect  Skin: No rashes      LABS:                        15.5   7.76  )-----------( 199      ( 18 Jun 2018 07:24 )             46.2     06-18    141  |  105  |  10  ----------------------------<  105<H>  3.9   |  27  |  1.00    Ca    9.0      18 Jun 2018 07:24    TPro  7.4  /  Alb  3.5  /  TBili  1.3<H>  /  DBili  x   /  AST  18  /  ALT  21  /  AlkPhos  76  06-18          RADIOLOGY & ADDITIONAL TESTS: INTERVAL HPI/OVERNIGHT EVENTS:  pt seen and examined family at bedside  going for lap jacob today  denies n/v/d, c/o mild abd pain, states pain is improved  +bm this am  labs noted afebrile overnight    MEDICATIONS  (STANDING):  aspirin enteric coated 81 milliGRAM(s) Oral daily  atorvastatin 40 milliGRAM(s) Oral at bedtime  ceFAZolin   IVPB 2000 milliGRAM(s) IV Intermittent once  pantoprazole  Injectable 40 milliGRAM(s) IV Push daily    MEDICATIONS  (PRN):  aluminum hydroxide/magnesium hydroxide/simethicone Suspension 30 milliLiter(s) Oral every 4 hours PRN Dyspepsia  zolpidem 5 milliGRAM(s) Oral at bedtime PRN Insomnia  zolpidem 5 milliGRAM(s) Oral at bedtime PRN Insomnia      Allergies    No Known Allergies    Intolerances        Review of Systems:    General:  No wt loss, fevers, chills, night sweats, fatigue   Eyes:  Good vision, no reported pain  ENT:  No sore throat, pain, runny nose, dysphagia  CV:  No pain, palpitations, hypo/hypertension  Resp:  No dyspnea, cough, tachypnea, wheezing  GI:  +pain, No nausea, No vomiting, No diarrhea, No constipation, No weight loss, No fever, No pruritis, No rectal bleeding, No melena, No dysphagia  :  No pain, bleeding, incontinence, nocturia  Muscle:  No pain, weakness  Neuro:  No weakness, tingling, memory problems  Psych:  No fatigue, insomnia, mood problems, depression  Endocrine:  No polyuria, polydypsia, cold/heat intolerance  Heme:  No petechiae, ecchymosis, easy bruisability  Skin:  No rash, tattoos, scars, edema      Vital Signs Last 24 Hrs  T(C): 36.4 (18 Jun 2018 07:57), Max: 36.8 (17 Jun 2018 11:21)  T(F): 97.6 (18 Jun 2018 07:57), Max: 98.3 (17 Jun 2018 11:21)  HR: 67 (18 Jun 2018 07:57) (65 - 98)  BP: 130/81 (18 Jun 2018 07:57) (129/64 - 148/92)  BP(mean): --  RR: 18 (18 Jun 2018 07:57) (15 - 18)  SpO2: 94% (18 Jun 2018 07:57) (94% - 99%)    PHYSICAL EXAM:    Constitutional: NAD  HEENT: EOMI, perrl  Neck: No LAD  Respiratory: CTA   Cardiovascular: S1 and S2, RRR  Gastrointestinal: obese abd BS+, soft, nd mild ttp ruq/epigastric region  Extremities: No peripheral edema  Vascular: 2+ peripheral pulses  Neurological: A/O x 3  Skin: No rashes      LABS:                        15.5   7.76  )-----------( 199      ( 18 Jun 2018 07:24 )             46.2     06-18    141  |  105  |  10  ----------------------------<  105<H>  3.9   |  27  |  1.00    Ca    9.0      18 Jun 2018 07:24    TPro  7.4  /  Alb  3.5  /  TBili  1.3<H>  /  DBili  x   /  AST  18  /  ALT  21  /  AlkPhos  76  06-18          RADIOLOGY & ADDITIONAL TESTS:

## 2018-06-18 NOTE — DISCHARGE NOTE ADULT - CARE PROVIDER_API CALL
Reg Reyes), Surgery  [address] [place] [address]  Phone: [phone]  Fax: [phone] Reg Reyes (MD), Surgery  [address]  [place] [address]  Phone: [phone]  Fax: [phone]    Sonido Blum (DO), Cardiovascular Disease  [address]  [place] [address]  Phone: [phone]  Fax: [phone]    Dayron Lang (DO), Gastroenterology; Internal Medicine  [address] [place] [address]  Phone: [phone]  Fax: [phone]

## 2018-06-18 NOTE — PROGRESS NOTE ADULT - PROVIDER SPECIALTY LIST ADULT
Cardiology
Family Medicine
Family Medicine
Gastroenterology
Gastroenterology
Neurology
Neurology
Cardiology

## 2018-06-18 NOTE — PRE-OP CHECKLIST - TAMPON REMOVED
Please refer pt to Endocrine in [place], order in. Thank you  
Tried to call patient. No answer.     Message sent to Endocrine to schedule patient an appointment.  
n/a

## 2018-06-18 NOTE — BRIEF OPERATIVE NOTE - PROCEDURE
<<-----Click on this checkbox to enter Procedure Laparoscopic cholecystectomy  06/18/2018    Active  GFITTERMAN

## 2018-06-18 NOTE — DISCHARGE NOTE ADULT - NS AS ACTIVITY OBS
Walking-Outdoors allowed/Walking-Indoors allowed/Stairs allowed/Do not drive or operate machinery/No Heavy lifting/straining/Showering allowed

## 2018-06-18 NOTE — PROGRESS NOTE ADULT - PROBLEM SELECTOR PROBLEM 1
Calculus of gallbladder with biliary obstruction but without cholecystitis
Calculus of gallbladder with biliary obstruction but without cholecystitis
Chest pain due to myocardial ischemia, unspecified ischemic chest pain type
Chest pain due to myocardial ischemia, unspecified ischemic chest pain type

## 2018-06-18 NOTE — DISCHARGE NOTE ADULT - PATIENT PORTAL LINK FT
You can access the [facility] Patient Portal, offered by [facility], by registering with the following website: [email or web address]

## 2018-06-18 NOTE — PROGRESS NOTE ADULT - SUBJECTIVE AND OBJECTIVE BOX
[facility] Cardiology Progress Note [phone] (Dr. Blum, Casie, Mili, Ciro)    CHIEF COMPLAINT: Patient is a 58y old  Male who presents with a chief complaint of Chest pain at rest. (16 Jun 2018 06:10)      Follow Up Today: The patient denies any chest discomfort or shortness of breath.    HPI:  Savannah Evans is a 58y Male complaining of chest pain.  Chest pain started about 2230 tonight while watching TV.  Similar episode day before for hours, but didn't seek medical attention.  Patient walked up 13 stairs, developed SOB and passed out for few seconds, he was unable to move his left leg while he was in the ambulance.  No back or leg pain. (16 Jun 2018 06:10)      PAST MEDICAL & SURGICAL HISTORY:  HTN (hypertension)  High cholesterol  H/O endovascular stent graft for abdominal aortic aneurysm      MEDICATIONS  (STANDING):  aspirin enteric coated 81 milliGRAM(s) Oral daily  atorvastatin 40 milliGRAM(s) Oral at bedtime  ceFAZolin   IVPB 2000 milliGRAM(s) IV Intermittent once  pantoprazole  Injectable 40 milliGRAM(s) IV Push daily    MEDICATIONS  (PRN):  aluminum hydroxide/magnesium hydroxide/simethicone Suspension 30 milliLiter(s) Oral every 4 hours PRN Dyspepsia  zolpidem 5 milliGRAM(s) Oral at bedtime PRN Insomnia  zolpidem 5 milliGRAM(s) Oral at bedtime PRN Insomnia      Allergies    No Known Allergies    Intolerances        REVIEW OF SYSTEMS:    All other review of systems is negative unless indicated above    Vital Signs Last 24 Hrs  T(C): 36.4 (18 Jun 2018 07:57), Max: 36.8 (17 Jun 2018 11:21)  T(F): 97.6 (18 Jun 2018 07:57), Max: 98.3 (17 Jun 2018 11:21)  HR: 67 (18 Jun 2018 07:57) (65 - 98)  BP: 130/81 (18 Jun 2018 07:57) (129/64 - 148/92)  BP(mean): --  RR: 18 (18 Jun 2018 07:57) (15 - 18)  SpO2: 94% (18 Jun 2018 07:57) (94% - 99%)    I&O's Summary      PHYSICAL EXAM:    Constitutional: NAD, awake and alert, well-developed  Eyes:  EOMI,  Pupils round, No oral cyanosis.  HEENT: No exudate or erythema  Pulmonary: Non-labored, breath sounds are clear bilaterally, No wheezing, rales or rhonchi  Cardiovascular: Regular, S1 and S2, No murmurs, rubs, gallops oir clicks  Gastrointestinal: Bowel Sounds present, soft, nontender.   Ext: No significant LE edema with good pulses x 4  Neurological: Alert, no gross focal motor deficits  Skin: No rashes.  Psych:  Mood & affect appropriate    LABS: All Labs Reviewed:                        15.5   7.76  )-----------( 199      ( 18 Jun 2018 07:24 )             46.2                         14.9   x     )-----------( x        ( 17 Jun 2018 06:24 )             44.4                         15.2   9.70  )-----------( 194      ( 16 Jun 2018 20:50 )             43.8     18 Jun 2018 07:24    141    |  105    |  10     ----------------------------<  105    3.9     |  27     |  1.00   16 Jun 2018 14:00    142    |  105    |  9      ----------------------------<  84     3.9     |  31     |  0.90   15 Johny 2018 23:56    142    |  108    |  11     ----------------------------<  126    3.5     |  26     |  0.89     Ca    9.0        18 Jun 2018 07:24  Ca    8.9        16 Jun 2018 14:00  Ca    8.6        15 Johny 2018 23:56    TPro  7.4    /  Alb  3.5    /  TBili  1.3    /  DBili  x      /  AST  18     /  ALT  21     /  AlkPhos  76     18 Jun 2018 07:24  TPro  7.5    /  Alb  3.6    /  TBili  0.7    /  DBili  x      /  AST  18     /  ALT  22     /  AlkPhos  92     15 Johny 2018 23:56      CARDIAC MARKERS ( 17 Jun 2018 06:24 )  <.015 ng/mL / x     / x     / x     / x          Blood Culture:         RADIOLOGY/EKG:    Attending Attestation:   20 minutes spent on total encounter; more than 50% of the visit was spent counseling and/or coordinating care by the attending physician.     ASSESSMENT AND PLAN [facility] Cardiology Progress Note [phone] (Dr. Blum, Casie, Mili, Ciro)    CHIEF COMPLAINT: Patient is a 58y old  Male who presents with a chief complaint of Chest pain at rest. (16 Jun 2018 06:10)      Follow Up Today: The patient denies any chest discomfort or shortness of breath.    HPI:  Savannah Evans is a 58y Male complaining of chest pain.  Chest pain started about 2230 tonight while watching TV.  Similar episode day before for hours, but didn't seek medical attention.  Patient walked up 13 stairs, developed SOB and passed out for few seconds, he was unable to move his left leg while he was in the ambulance.  No back or leg pain. (16 Jun 2018 06:10)      PAST MEDICAL & SURGICAL HISTORY:  HTN (hypertension)  High cholesterol  H/O endovascular stent graft for abdominal aortic aneurysm      MEDICATIONS  (STANDING):  aspirin enteric coated 81 milliGRAM(s) Oral daily  atorvastatin 40 milliGRAM(s) Oral at bedtime  ceFAZolin   IVPB 2000 milliGRAM(s) IV Intermittent once  pantoprazole  Injectable 40 milliGRAM(s) IV Push daily    MEDICATIONS  (PRN):  aluminum hydroxide/magnesium hydroxide/simethicone Suspension 30 milliLiter(s) Oral every 4 hours PRN Dyspepsia  zolpidem 5 milliGRAM(s) Oral at bedtime PRN Insomnia  zolpidem 5 milliGRAM(s) Oral at bedtime PRN Insomnia      Allergies    No Known Allergies    Intolerances        REVIEW OF SYSTEMS:    All other review of systems is negative unless indicated above    Vital Signs Last 24 Hrs  T(C): 36.4 (18 Jun 2018 07:57), Max: 36.8 (17 Jun 2018 11:21)  T(F): 97.6 (18 Jun 2018 07:57), Max: 98.3 (17 Jun 2018 11:21)  HR: 67 (18 Jun 2018 07:57) (65 - 98)  BP: 130/81 (18 Jun 2018 07:57) (129/64 - 148/92)  BP(mean): --  RR: 18 (18 Jun 2018 07:57) (15 - 18)  SpO2: 94% (18 Jun 2018 07:57) (94% - 99%)    I&O's Summary      PHYSICAL EXAM:    Constitutional: NAD, awake and alert, well-developed  Eyes:  EOMI,  Pupils round, No oral cyanosis.  HEENT: No exudate or erythema  Pulmonary: Non-labored, breath sounds are clear bilaterally, No wheezing, rales or rhonchi  Cardiovascular: Regular, S1 and S2, No murmurs, rubs, gallops oir clicks  Gastrointestinal: Bowel Sounds present, soft, nontender.   Ext: No significant LE edema with good pulses x 4  Neurological: Alert, no gross focal motor deficits  Skin: No rashes.  Psych:  Mood & affect appropriate    LABS: All Labs Reviewed:                        15.5   7.76  )-----------( 199      ( 18 Jun 2018 07:24 )             46.2                         14.9   x     )-----------( x        ( 17 Jun 2018 06:24 )             44.4                         15.2   9.70  )-----------( 194      ( 16 Jun 2018 20:50 )             43.8     18 Jun 2018 07:24    141    |  105    |  10     ----------------------------<  105    3.9     |  27     |  1.00   16 Jun 2018 14:00    142    |  105    |  9      ----------------------------<  84     3.9     |  31     |  0.90   15 Johny 2018 23:56    142    |  108    |  11     ----------------------------<  126    3.5     |  26     |  0.89     Ca    9.0        18 Jun 2018 07:24  Ca    8.9        16 Jun 2018 14:00  Ca    8.6        15 Johny 2018 23:56    TPro  7.4    /  Alb  3.5    /  TBili  1.3    /  DBili  x      /  AST  18     /  ALT  21     /  AlkPhos  76     18 Jun 2018 07:24  TPro  7.5    /  Alb  3.6    /  TBili  0.7    /  DBili  x      /  AST  18     /  ALT  22     /  AlkPhos  92     15 Johny 2018 23:56      CARDIAC MARKERS ( 17 Jun 2018 06:24 )  <.015 ng/mL / x     / x     / x     / x          Blood Culture:         RADIOLOGY/EKG:  Exercise stress test 5/9/2018  normal stress test - 9min and 43 seconds with no ST T changes  Echocardiogram 5/12/2018  EF 60%, trace MR, mild TR, asc Ao 3.6cm which is borderline for dilation, mild LVH    Attending Attestation:   20 minutes spent on total encounter; more than 50% of the visit was spent counseling and/or coordinating care by the attending physician.     ASSESSMENT AND PLAN

## 2018-06-18 NOTE — OCCUPATIONAL THERAPY INITIAL EVALUATION ADULT - ADDITIONAL COMMENTS
Pt lives with spouse in a house with 3 steps to enter with no handrail. 13 steps with handrail to access bedroom and bathtub with sliding doors.

## 2018-06-18 NOTE — PROGRESS NOTE ADULT - ASSESSMENT
Midepigastic pain/Chest pain  cardiac enzymes x 3  echo  continue telemetry monitoring  GI workup in progress    Syncope  most likely vasovagal secondary to abdominal pain    cardiac enzymes/ECG negative    patient states he will signout today, and will pursue GI workup as an outpatient  He has a follow up with his cardiologist Dr Blum scheduled for July 1  advised him to talk to GI before leaving Midepigastic pain/Chest pain found with biliary obstruction for abdominal surgery  cardiac enzymes x 3 doubt ACS    continue telemetry monitoring  GI workup in progress    Syncope  most likely vasovagal secondary to abdominal pain, but continue with monitoring     Cardiac optimized for abdominal surgery.  Get post op ECG  Will follow.

## 2018-06-20 LAB — SURGICAL PATHOLOGY FINAL REPORT - CH: SIGNIFICANT CHANGE UP

## 2018-12-01 NOTE — CONSULT NOTE ADULT - PROBLEM SELECTOR PROBLEM 1
palpitations this AM that woke her from sleep. patient states she had an ablation on 11/28/18. denies CP, SOB
Calculus of gallbladder with biliary obstruction but without cholecystitis
Calculus of gallbladder with biliary obstruction but without cholecystitis

## 2019-05-08 PROBLEM — E78.00 PURE HYPERCHOLESTEROLEMIA, UNSPECIFIED: Chronic | Status: ACTIVE | Noted: 2018-04-16

## 2019-05-08 PROBLEM — I10 ESSENTIAL (PRIMARY) HYPERTENSION: Chronic | Status: ACTIVE | Noted: 2018-06-15

## 2019-07-18 ENCOUNTER — APPOINTMENT (OUTPATIENT)
Dept: ORTHOPEDIC SURGERY | Facility: CLINIC | Age: 59
End: 2019-07-18
Payer: COMMERCIAL

## 2019-07-18 DIAGNOSIS — G89.29 PAIN IN RIGHT KNEE: ICD-10-CM

## 2019-07-18 DIAGNOSIS — M25.562 PAIN IN RIGHT KNEE: ICD-10-CM

## 2019-07-18 DIAGNOSIS — M25.561 PAIN IN RIGHT KNEE: ICD-10-CM

## 2019-07-18 DIAGNOSIS — M71.22 SYNOVIAL CYST OF POPLITEAL SPACE [BAKER], LEFT KNEE: ICD-10-CM

## 2019-07-18 DIAGNOSIS — M54.41 LUMBAGO WITH SCIATICA, RIGHT SIDE: ICD-10-CM

## 2019-07-18 PROCEDURE — 99204 OFFICE O/P NEW MOD 45 MIN: CPT | Mod: 25

## 2019-07-18 PROCEDURE — 73562 X-RAY EXAM OF KNEE 3: CPT | Mod: 50

## 2019-07-18 PROCEDURE — 20610 DRAIN/INJ JOINT/BURSA W/O US: CPT | Mod: RT

## 2019-07-18 NOTE — DISCUSSION/SUMMARY
[de-identified] : Assessment: Bilateral Knee Osteoarthritis/Pain\par \par Plan for left knee injection:\par 1. RICE Therapy.\par 2. Antiinflammatories/Tylenol as needed for pain of discomfort.\par 3. The patient was advised to rest the knee for 24-48 hours post injection.  The patient is able to resume normal activities in 24-48 hours post injection if the knee feels good.\par 4. Continue with a home exercise/stretching program. \par 5. All the patients questions were answered.  If the patient is experiencing any problems or has concerns they were advised to call the office or make an appointment to come in  to be evaluated.\par \par *As for the left knee he has pain and discomfort popliteal fossa left knee. He was given an ultrasound prescription for left knee.  If Baker's cyst her prescription states to have it drained.\par \par Assessment: Low back pain with sciatica.\par As for his lower back I prescribed him cyclobenzaprine to take at night to help him sleep. I also gave him a Medrol Dosepak. He was given a physical therapy prescription for the lower back as well as the knees. If the back does not get better consider MRI and physiatry.

## 2019-07-18 NOTE — PHYSICAL EXAM
[de-identified] : General: Alert and oriented x3. In no acute distress. Pleasant in nature with a normal affect. No apparent respiratory distress.\par R Knee Exam\par Skin: Clean, dry, intact\par Inspection: No obvious malalignment, no masses, no swelling, no effusion\par Pulses: 2+ DP/PT pulses\par ROM: L Knee 0-130 degrees of flexion. No pain with deep knee flexion.\par Tenderness: No MJLT. No LJLT. No pain over the patella facets. No pain to the quadriceps mechanism.\par Stability: Stable to varus, valgus, lachman testing. Negative anterior/posterior drawer.\par Strength: 5/5 Q/H/TA/GS/EHL, no atrophy\par Neuro: In tact to light touch throughout, DTR's normal\par Additional tests: Negative McMurrays test, Negative patellar grind test.\par \par L Knee Exam\par Skin: Clean, dry, intact\par Inspection: No obvious malalignment, no masses, no swelling, no effusion\par Pulses: 2+ DP/PT pulses\par ROM: L Knee 0-130 degrees of flexion. No pain with deep knee flexion.\par Tenderness: No MJLT. No LJLT. No pain over the patella facets. No pain to the quadriceps mechanism.\par Stability: Stable to varus, valgus, lachman testing. Negative anterior/posterior drawer.\par Strength: 5/5 Q/H/TA/GS/EHL, no atrophy\par Neuro: In tact to light touch throughout, DTR's normal\par Additional tests: Negative McMurrays test, Negative patellar grind test.\par \par Lumbar spine:\par \par General: Alert and oriented x3.  In no acute distress.  Pleasant in nature with a normal affect.  No apparent respiratory distress. \par Inspection: No swelling, No scoliosis present.\par \par ROM:\par Forward Flexion: 70 degrees\par Extension: Neutral degrees\par Lateral Flexion to Left: 40 degrees\par Lateral Flexion to Right: 40 degrees\par Rotation to Left: 45 degrees\par Rotation to Right: 45 degrees\par \par Normal Hip ROM.\par \par Palpation: \par Thoracolumbar Paraspinal Muscles: Positive\par Costovertebral Angle Pain/Spine Percussion: Negative for pain over the Kidney's with Spine Percussion.\par \par Special Tests:\par Straight Leg Raise: Positive right lower extremity\par \par Neurovascularly Intact Sensory and Motor with No Foot Drop present on examination today.  [de-identified] : X-rays of bilateral knees show medial compartment arthrosis both knees. He also has patellofemoral compartment osteoarthritis bilateral knees.

## 2019-07-18 NOTE — HISTORY OF PRESENT ILLNESS
[de-identified] : 59 year year old male presenting with BL knee pain with sciatica and low back pain right lower extremity . The patient’s pain is noted to be a 9/10. The patient's pain began on 7/4/19. The pain is described as constant, radiating, and shooting. The pain is made worse by walking, driving, and lying down for both the knee and the lower back.  He denies bowel or bladder incontinence. There is no foot drop present. As for the right knee he does have a history of a right knee arthroscopic meniscal repair. The knee does not lock or catch on him.\par \par He is currently taking no pain medication. No other complaints at this time.\par \par \par

## 2019-07-18 NOTE — CONSULT NOTE ADULT - PROBLEM/RECOMMENDATION-1
Per Freddy HERRERA, provider spoke with father who gave consent to treat patient.  
Unable to get onsent from  [facility] via telephone. Patient called and the patients mother was unwilling to speak with nurse to give consent for patient to be seen. Pt has a   and is attempting to contact the mother.   
DISPLAY PLAN FREE TEXT
DISPLAY PLAN FREE TEXT

## 2019-07-18 NOTE — CONSULT LETTER
[Consult Letter:] : I had the pleasure of evaluating your patient, [unfilled]. [Please see my note below.] : Please see my note below. [Consult Closing:] : Thank you very much for allowing me to participate in the care of this patient.  If you have any questions, please do not hesitate to contact me. [Sincerely,] : Sincerely, [FreeTextEntry3] : Bala Espinoza, DO\par Foot and Ankle Surgery\par

## 2019-07-18 NOTE — ADDENDUM
[FreeTextEntry1] : I, Jadiel Man, acted solely as a scribe for Dr. Bala Espinoza on this date 07/18/2019 .\par All medical record entries made by the Scribe were at my, Dr. Bala Espinoza, direction and personally dictated by me on 07/18/2019 . I have reviewed the chart and agree that the record accurately reflects my personal performance of the history, physical exam, assessment and plan. I have also personally directed, reviewed, and agreed with the chart.\par \par \par

## 2019-08-02 ENCOUNTER — APPOINTMENT (OUTPATIENT)
Dept: ORTHOPEDIC SURGERY | Facility: CLINIC | Age: 59
End: 2019-08-02
Payer: COMMERCIAL

## 2019-08-02 DIAGNOSIS — M17.12 UNILATERAL PRIMARY OSTEOARTHRITIS, LEFT KNEE: ICD-10-CM

## 2019-08-02 DIAGNOSIS — M17.11 UNILATERAL PRIMARY OSTEOARTHRITIS, RIGHT KNEE: ICD-10-CM

## 2019-08-02 PROCEDURE — 20610 DRAIN/INJ JOINT/BURSA W/O US: CPT | Mod: 50

## 2019-08-02 NOTE — HISTORY OF PRESENT ILLNESS
[de-identified] : Bilateral knee pain.\par Gel-One INJ\par LOT#[id number]\par EXP: 05/26/2020\par \par Brandon is here to receive gel one injections bilateral knees for his osteoarthritis. He has no pain today in the office.

## 2019-08-02 NOTE — DISCUSSION/SUMMARY
[de-identified] : Assessment: Bilateral Knee Osteoarthritis/Pain\par \par Plan:\par 1. RICE Therapy.\par 2. Antiinflammatories/Tylenol as needed for pain of discomfort.\par 3. The patient was advised to rest the knee for 24-48 hours post injection.  The patient is able to resume normal activities in 24-48 hours post injection if the knee feels good.\par 4. Continue with a home exercise/stretching program. \par 5. All the patients questions were answered.  If the patient is experiencing any problems or has concerns they were advised to call the office or make an appointment to come in  to be evaluated.

## 2019-08-02 NOTE — PROCEDURE
[de-identified] : Laterality: Bilateral Knee\par \par The risks and benefits of the injection were reviewed with the patient today in office and all their questions were answered.  The injection site was the lateral knee with the patient lying supine on the exam table.  Prior to giving the injection the injection site was prepped with Chloraprep and a sterile field was created.  Sterile technique was carried out throughout the procedure.  After verbal consent from the patient we went ahead and injected the bilateral knee today with Gel One one using a 22 angelic 1.5" needle.  The medication was placed into the knee without complication.  Post injection the patient reported no pain, had a normal gait and good motion of the knee.  The patient denied numbness and tingling going down their leg.  There were no complications during the procedure. no

## 2019-08-02 NOTE — PHYSICAL EXAM
[de-identified] : Laterality: Bilateral knees\par \par General: Alert and oriented x3.  In no acute distress.  Pleasant in nature with a normal affect.  No apparent respiratory distress. \par \par Erythema, Warmth, Rubor: Negative\par Swelling/Edema: Negative \par ROM: 0-130 degrees\par Lacey's Test: Negative \par Medial Joint Line TTP: Positive\par Lateral Joint Line TTP: Negative\par Lachman Exam/Anterior Drawer/Pivot Shift Test: Negative \par MCL Pain: Negative\par LCL Pain: Negative\par Iliotibial Band Pain: Negative\par Patellofemoral Joint Pain: Negative\par Pes Anserinus TTP: Negative\par Homans Sign: Negative\par Neurovascularly: Intact with no sensory or motor deficits\par  [de-identified] : No imaging performed today.

## 2019-08-06 ENCOUNTER — APPOINTMENT (OUTPATIENT)
Dept: OTOLARYNGOLOGY | Facility: CLINIC | Age: 59
End: 2019-08-06
Payer: COMMERCIAL

## 2019-08-06 VITALS
RESPIRATION RATE: 16 BRPM | SYSTOLIC BLOOD PRESSURE: 146 MMHG | HEIGHT: 67.5 IN | WEIGHT: 214 LBS | DIASTOLIC BLOOD PRESSURE: 90 MMHG | HEART RATE: 85 BPM | BODY MASS INDEX: 33.2 KG/M2

## 2019-08-06 PROCEDURE — 99204 OFFICE O/P NEW MOD 45 MIN: CPT

## 2019-08-06 RX ORDER — CYCLOBENZAPRINE HYDROCHLORIDE 5 MG/1
5 TABLET, FILM COATED ORAL 3 TIMES DAILY
Qty: 30 | Refills: 0 | Status: DISCONTINUED | COMMUNITY
Start: 2019-07-18 | End: 2019-08-06

## 2019-08-06 RX ORDER — METHYLPREDNISOLONE 4 MG/1
4 TABLET ORAL
Qty: 1 | Refills: 1 | Status: DISCONTINUED | COMMUNITY
Start: 2019-07-18 | End: 2019-08-06

## 2019-08-06 RX ORDER — HYALURONATE SOD, CROSS-LINKED 30 MG/3 ML
30 SYRINGE (ML) INTRAARTICULAR
Qty: 2 | Refills: 0 | Status: DISCONTINUED | OUTPATIENT
Start: 2019-07-18 | End: 2019-08-06

## 2019-08-06 RX ORDER — VALSARTAN AND HYDROCHLOROTHIAZIDE 160; 12.5 MG/1; MG/1
160-12.5 TABLET, FILM COATED ORAL
Refills: 0 | Status: ACTIVE | COMMUNITY

## 2019-08-06 NOTE — PHYSICAL EXAM
[Nodule] : nodule [FreeTextEntry1] : sl full R tail parotid region.   [Midline] : trachea located in midline position [Normal] : no rashes

## 2019-08-06 NOTE — HISTORY OF PRESENT ILLNESS
[Neck Mass] : neck mass [Difficulty Swallowing] : no difficulty swallowing [de-identified] : Mr. Evans presents for for follow up last seen on July 15, 2014 for right parotid Warthin's Tumor.  He was supposed to have surgery in August 2014 which was cancelled.  He notes that it has increased in size but denies any pain or discomfort.  No recent imaging.  His Graves is in remission off Tapazole for some time now and managed by his primary doc.  [Painful Swallowing] : no painful swallowing

## 2019-09-09 LAB
T3FREE SERPL-MCNC: 3.61 PG/ML
T4 FREE SERPL-MCNC: 1.3 NG/DL
TSH SERPL-ACNC: 4.03 UIU/ML

## 2019-09-10 ENCOUNTER — RESULT REVIEW (OUTPATIENT)
Age: 59
End: 2019-09-10

## 2019-10-17 ENCOUNTER — OUTPATIENT (OUTPATIENT)
Dept: OUTPATIENT SERVICES | Facility: HOSPITAL | Age: 59
LOS: 1 days | End: 2019-10-17
Payer: COMMERCIAL

## 2019-10-17 VITALS
WEIGHT: 248.02 LBS | OXYGEN SATURATION: 98 % | RESPIRATION RATE: 16 BRPM | TEMPERATURE: 98 F | HEIGHT: 68 IN | DIASTOLIC BLOOD PRESSURE: 80 MMHG | HEART RATE: 72 BPM | SYSTOLIC BLOOD PRESSURE: 130 MMHG

## 2019-10-17 DIAGNOSIS — D11.9 BENIGN NEOPLASM OF MAJOR SALIVARY GLAND, UNSPECIFIED: ICD-10-CM

## 2019-10-17 DIAGNOSIS — I10 ESSENTIAL (PRIMARY) HYPERTENSION: ICD-10-CM

## 2019-10-17 DIAGNOSIS — Z90.49 ACQUIRED ABSENCE OF OTHER SPECIFIED PARTS OF DIGESTIVE TRACT: Chronic | ICD-10-CM

## 2019-10-17 DIAGNOSIS — Z95.828 PRESENCE OF OTHER VASCULAR IMPLANTS AND GRAFTS: Chronic | ICD-10-CM

## 2019-10-17 DIAGNOSIS — Z98.890 OTHER SPECIFIED POSTPROCEDURAL STATES: Chronic | ICD-10-CM

## 2019-10-17 DIAGNOSIS — G47.33 OBSTRUCTIVE SLEEP APNEA (ADULT) (PEDIATRIC): ICD-10-CM

## 2019-10-17 LAB
ANION GAP SERPL CALC-SCNC: 16 MMO/L — HIGH (ref 7–14)
BASOPHILS # BLD AUTO: 0.05 K/UL — SIGNIFICANT CHANGE UP (ref 0–0.2)
BASOPHILS NFR BLD AUTO: 0.6 % — SIGNIFICANT CHANGE UP (ref 0–2)
BUN SERPL-MCNC: 13 MG/DL — SIGNIFICANT CHANGE UP (ref 7–23)
CALCIUM SERPL-MCNC: 9.8 MG/DL — SIGNIFICANT CHANGE UP (ref 8.4–10.5)
CHLORIDE SERPL-SCNC: 101 MMOL/L — SIGNIFICANT CHANGE UP (ref 98–107)
CO2 SERPL-SCNC: 25 MMOL/L — SIGNIFICANT CHANGE UP (ref 22–31)
CREAT SERPL-MCNC: 0.87 MG/DL — SIGNIFICANT CHANGE UP (ref 0.5–1.3)
EOSINOPHIL # BLD AUTO: 0.14 K/UL — SIGNIFICANT CHANGE UP (ref 0–0.5)
EOSINOPHIL NFR BLD AUTO: 1.6 % — SIGNIFICANT CHANGE UP (ref 0–6)
GLUCOSE SERPL-MCNC: 86 MG/DL — SIGNIFICANT CHANGE UP (ref 70–99)
HCT VFR BLD CALC: 47.3 % — SIGNIFICANT CHANGE UP (ref 39–50)
HGB BLD-MCNC: 15.5 G/DL — SIGNIFICANT CHANGE UP (ref 13–17)
IMM GRANULOCYTES NFR BLD AUTO: 0.4 % — SIGNIFICANT CHANGE UP (ref 0–1.5)
LYMPHOCYTES # BLD AUTO: 2.11 K/UL — SIGNIFICANT CHANGE UP (ref 1–3.3)
LYMPHOCYTES # BLD AUTO: 23.5 % — SIGNIFICANT CHANGE UP (ref 13–44)
MCHC RBC-ENTMCNC: 30.6 PG — SIGNIFICANT CHANGE UP (ref 27–34)
MCHC RBC-ENTMCNC: 32.8 % — SIGNIFICANT CHANGE UP (ref 32–36)
MCV RBC AUTO: 93.5 FL — SIGNIFICANT CHANGE UP (ref 80–100)
MONOCYTES # BLD AUTO: 0.61 K/UL — SIGNIFICANT CHANGE UP (ref 0–0.9)
MONOCYTES NFR BLD AUTO: 6.8 % — SIGNIFICANT CHANGE UP (ref 2–14)
NEUTROPHILS # BLD AUTO: 6.04 K/UL — SIGNIFICANT CHANGE UP (ref 1.8–7.4)
NEUTROPHILS NFR BLD AUTO: 67.1 % — SIGNIFICANT CHANGE UP (ref 43–77)
NRBC # FLD: 0 K/UL — SIGNIFICANT CHANGE UP (ref 0–0)
PLATELET # BLD AUTO: 244 K/UL — SIGNIFICANT CHANGE UP (ref 150–400)
PMV BLD: 10 FL — SIGNIFICANT CHANGE UP (ref 7–13)
POTASSIUM SERPL-MCNC: 3.7 MMOL/L — SIGNIFICANT CHANGE UP (ref 3.5–5.3)
POTASSIUM SERPL-SCNC: 3.7 MMOL/L — SIGNIFICANT CHANGE UP (ref 3.5–5.3)
RBC # BLD: 5.06 M/UL — SIGNIFICANT CHANGE UP (ref 4.2–5.8)
RBC # FLD: 12.6 % — SIGNIFICANT CHANGE UP (ref 10.3–14.5)
SODIUM SERPL-SCNC: 142 MMOL/L — SIGNIFICANT CHANGE UP (ref 135–145)
WBC # BLD: 8.99 K/UL — SIGNIFICANT CHANGE UP (ref 3.8–10.5)
WBC # FLD AUTO: 8.99 K/UL — SIGNIFICANT CHANGE UP (ref 3.8–10.5)

## 2019-10-17 PROCEDURE — 93010 ELECTROCARDIOGRAM REPORT: CPT

## 2019-10-17 RX ORDER — SODIUM CHLORIDE 9 MG/ML
1000 INJECTION, SOLUTION INTRAVENOUS
Refills: 0 | Status: DISCONTINUED | OUTPATIENT
Start: 2019-11-01 | End: 2019-11-16

## 2019-10-17 NOTE — H&P PST ADULT - MUSCULOSKELETAL
detailed exam no joint swelling/no joint warmth/no calf tenderness/normal strength/ROM intact/no joint erythema details…

## 2019-10-17 NOTE — H&P PST ADULT - NSICDXPROBLEM_GEN_ALL_CORE_FT
PROBLEM DIAGNOSES  Problem: Benign neoplasm of major salivary gland  Assessment and Plan: pt scheduled for Right Parotidectomy on 11/1/19  Preop instructions provided. Pt verbalized understanding.   Pepcid for GI prophylaxis with written and verbal instruction provided    written and verbal instructions with teach back on chlorhexidine shampoo provided,  pt verbalized understanding with returned demonstration   med eval pending on 10/31/19 as per surgeon, copy requested    Problem: HTN (hypertension)  Assessment and Plan: continue meds as prescribed    Problem: SALOMÓN (obstructive sleep apnea)  Assessment and Plan: OR booking notified

## 2019-10-17 NOTE — H&P PST ADULT - NEGATIVE ENMT SYMPTOMS
warthin's tumor, see HPI/no dysphagia/no throat pain/no sinus symptoms/no post-nasal discharge/no nasal congestion

## 2019-10-17 NOTE — H&P PST ADULT - HISTORY OF PRESENT ILLNESS
59 y.o. male with hx of hypertension, SALOMÓN, AAA s/p endovascular repair, preop diagnosis benign neoplasm of major salivary gland, reports diagnosed with Warthin's tumor "several years ago and was scheduled for surgery previously, but it was cancelled", c/o swelling on the right side of the neck getting progressively worse, denies pain, scheduled for Right Parotidectomy on 11/1/19

## 2019-10-17 NOTE — H&P PST ADULT - NSICDXPASTSURGICALHX_GEN_ALL_CORE_FT
PAST SURGICAL HISTORY:  H/O arthroscopy of knee bilateral knee meniscus repair    H/O endovascular stent graft for abdominal aortic aneurysm 2016    H/O umbilical hernia repair     S/P cholecystectomy

## 2019-10-17 NOTE — H&P PST ADULT - NSICDXPASTMEDICALHX_GEN_ALL_CORE_FT
PAST MEDICAL HISTORY:  Benign neoplasm of major salivary gland, unspecified     High cholesterol     HTN (hypertension)     SALOMÓN (obstructive sleep apnea)     S/P AAA repair

## 2019-10-31 ENCOUNTER — TRANSCRIPTION ENCOUNTER (OUTPATIENT)
Age: 59
End: 2019-10-31

## 2019-11-01 ENCOUNTER — RESULT REVIEW (OUTPATIENT)
Age: 59
End: 2019-11-01

## 2019-11-01 ENCOUNTER — OUTPATIENT (OUTPATIENT)
Dept: OUTPATIENT SERVICES | Facility: HOSPITAL | Age: 59
LOS: 1 days | Discharge: HOME | End: 2019-11-01
Payer: COMMERCIAL

## 2019-11-01 ENCOUNTER — APPOINTMENT (OUTPATIENT)
Dept: OTOLARYNGOLOGY | Facility: HOSPITAL | Age: 59
End: 2019-11-01

## 2019-11-01 VITALS
OXYGEN SATURATION: 96 % | RESPIRATION RATE: 16 BRPM | DIASTOLIC BLOOD PRESSURE: 82 MMHG | SYSTOLIC BLOOD PRESSURE: 141 MMHG | HEART RATE: 78 BPM | TEMPERATURE: 99 F | HEIGHT: 68 IN | WEIGHT: 246.92 LBS

## 2019-11-01 VITALS
RESPIRATION RATE: 15 BRPM | SYSTOLIC BLOOD PRESSURE: 130 MMHG | HEART RATE: 75 BPM | OXYGEN SATURATION: 100 % | DIASTOLIC BLOOD PRESSURE: 60 MMHG

## 2019-11-01 DIAGNOSIS — D11.9 BENIGN NEOPLASM OF MAJOR SALIVARY GLAND, UNSPECIFIED: ICD-10-CM

## 2019-11-01 DIAGNOSIS — Z98.890 OTHER SPECIFIED POSTPROCEDURAL STATES: Chronic | ICD-10-CM

## 2019-11-01 DIAGNOSIS — Z95.828 PRESENCE OF OTHER VASCULAR IMPLANTS AND GRAFTS: Chronic | ICD-10-CM

## 2019-11-01 DIAGNOSIS — Z90.49 ACQUIRED ABSENCE OF OTHER SPECIFIED PARTS OF DIGESTIVE TRACT: Chronic | ICD-10-CM

## 2019-11-01 PROCEDURE — 42410 EXCISE PAROTID GLAND/LESION: CPT | Mod: AS,RT

## 2019-11-01 PROCEDURE — 88307 TISSUE EXAM BY PATHOLOGIST: CPT | Mod: 26

## 2019-11-01 PROCEDURE — 42410 EXCISE PAROTID GLAND/LESION: CPT | Mod: RT

## 2019-11-01 RX ORDER — IBUPROFEN 200 MG
3 TABLET ORAL
Qty: 0 | Refills: 0 | DISCHARGE

## 2019-11-01 RX ORDER — ONDANSETRON 8 MG/1
4 TABLET, FILM COATED ORAL ONCE
Refills: 0 | Status: DISCONTINUED | OUTPATIENT
Start: 2019-11-01 | End: 2019-11-16

## 2019-11-01 RX ORDER — SODIUM CHLORIDE 9 MG/ML
1000 INJECTION, SOLUTION INTRAVENOUS
Refills: 0 | Status: DISCONTINUED | OUTPATIENT
Start: 2019-11-01 | End: 2019-11-16

## 2019-11-01 RX ORDER — OXYCODONE AND ACETAMINOPHEN 5; 325 MG/1; MG/1
5-325 TABLET ORAL
Qty: 10 | Refills: 0 | Status: ACTIVE | COMMUNITY
Start: 2019-11-01 | End: 1900-01-01

## 2019-11-01 RX ORDER — HYDROMORPHONE HYDROCHLORIDE 2 MG/ML
0.5 INJECTION INTRAMUSCULAR; INTRAVENOUS; SUBCUTANEOUS
Refills: 0 | Status: DISCONTINUED | OUTPATIENT
Start: 2019-11-01 | End: 2019-11-01

## 2019-11-01 RX ADMIN — HYDROMORPHONE HYDROCHLORIDE 0.5 MILLIGRAM(S): 2 INJECTION INTRAMUSCULAR; INTRAVENOUS; SUBCUTANEOUS at 13:43

## 2019-11-01 RX ADMIN — HYDROMORPHONE HYDROCHLORIDE 0.5 MILLIGRAM(S): 2 INJECTION INTRAMUSCULAR; INTRAVENOUS; SUBCUTANEOUS at 14:30

## 2019-11-01 RX ADMIN — HYDROMORPHONE HYDROCHLORIDE 0.5 MILLIGRAM(S): 2 INJECTION INTRAMUSCULAR; INTRAVENOUS; SUBCUTANEOUS at 13:41

## 2019-11-01 RX ADMIN — HYDROMORPHONE HYDROCHLORIDE 0.5 MILLIGRAM(S): 2 INJECTION INTRAMUSCULAR; INTRAVENOUS; SUBCUTANEOUS at 14:07

## 2019-11-01 RX ADMIN — HYDROMORPHONE HYDROCHLORIDE 0.5 MILLIGRAM(S): 2 INJECTION INTRAMUSCULAR; INTRAVENOUS; SUBCUTANEOUS at 13:12

## 2019-11-01 RX ADMIN — HYDROMORPHONE HYDROCHLORIDE 0.5 MILLIGRAM(S): 2 INJECTION INTRAMUSCULAR; INTRAVENOUS; SUBCUTANEOUS at 14:51

## 2019-11-01 NOTE — BRIEF OPERATIVE NOTE - NSICDXBRIEFPREOP_GEN_ALL_CORE_FT
PRE-OP DIAGNOSIS:  Warthin's tumor 01-Nov-2019 12:01:04  Osiel Andersen  Warthin's tumor 01-Nov-2019 12:00:48  Osiel Andersen

## 2019-11-04 ENCOUNTER — APPOINTMENT (OUTPATIENT)
Dept: OTOLARYNGOLOGY | Facility: CLINIC | Age: 59
End: 2019-11-04
Payer: COMMERCIAL

## 2019-11-04 PROBLEM — G47.33 OBSTRUCTIVE SLEEP APNEA (ADULT) (PEDIATRIC): Chronic | Status: ACTIVE | Noted: 2019-10-17

## 2019-11-04 PROBLEM — D11.9 BENIGN NEOPLASM OF MAJOR SALIVARY GLAND, UNSPECIFIED: Chronic | Status: ACTIVE | Noted: 2019-10-17

## 2019-11-04 PROBLEM — Z98.890 OTHER SPECIFIED POSTPROCEDURAL STATES: Chronic | Status: ACTIVE | Noted: 2019-10-17

## 2019-11-04 PROCEDURE — 99024 POSTOP FOLLOW-UP VISIT: CPT

## 2019-11-07 ENCOUNTER — APPOINTMENT (OUTPATIENT)
Dept: OTOLARYNGOLOGY | Facility: CLINIC | Age: 59
End: 2019-11-07
Payer: COMMERCIAL

## 2019-11-07 DIAGNOSIS — D11.9 BENIGN NEOPLASM OF MAJOR SALIVARY GLAND, UNSPECIFIED: ICD-10-CM

## 2019-11-07 PROCEDURE — 99024 POSTOP FOLLOW-UP VISIT: CPT

## 2019-11-08 NOTE — HISTORY OF PRESENT ILLNESS
[None] : The patient is currently asymptomatic. [de-identified] : Mr. Evans presents one week post exc R parotid Warthins without any complaints.  Denies any pain, discomfort, or paresthesias.  final path pending [Neck Mass] : no neck mass [Difficulty Swallowing] : no difficulty swallowing [Painful Swallowing] : no painful swallowing

## 2019-12-26 ENCOUNTER — APPOINTMENT (OUTPATIENT)
Dept: ORTHOPEDIC SURGERY | Facility: CLINIC | Age: 59
End: 2019-12-26

## 2020-01-14 ENCOUNTER — APPOINTMENT (OUTPATIENT)
Dept: OTOLARYNGOLOGY | Facility: CLINIC | Age: 60
End: 2020-01-14

## 2020-07-10 LAB — SURGICAL PATHOLOGY STUDY: SIGNIFICANT CHANGE UP

## 2020-09-15 ENCOUNTER — TRANSCRIPTION ENCOUNTER (OUTPATIENT)
Age: 60
End: 2020-09-15

## 2021-01-22 ENCOUNTER — APPOINTMENT (OUTPATIENT)
Dept: ORTHOPEDIC SURGERY | Facility: CLINIC | Age: 61
End: 2021-01-22
Payer: COMMERCIAL

## 2021-01-22 VITALS
HEIGHT: 68 IN | SYSTOLIC BLOOD PRESSURE: 198 MMHG | HEART RATE: 71 BPM | DIASTOLIC BLOOD PRESSURE: 106 MMHG | BODY MASS INDEX: 35.46 KG/M2 | WEIGHT: 234 LBS

## 2021-01-22 VITALS — DIASTOLIC BLOOD PRESSURE: 114 MMHG | SYSTOLIC BLOOD PRESSURE: 171 MMHG | HEART RATE: 74 BPM

## 2021-01-22 DIAGNOSIS — M79.671 PAIN IN RIGHT FOOT: ICD-10-CM

## 2021-01-22 DIAGNOSIS — S99.921A UNSPECIFIED INJURY OF RIGHT FOOT, INITIAL ENCOUNTER: ICD-10-CM

## 2021-01-22 PROCEDURE — 99214 OFFICE O/P EST MOD 30 MIN: CPT

## 2021-01-22 PROCEDURE — 73630 X-RAY EXAM OF FOOT: CPT | Mod: RT

## 2021-01-22 PROCEDURE — 99072 ADDL SUPL MATRL&STAF TM PHE: CPT

## 2021-01-22 RX ORDER — MELOXICAM 15 MG/1
15 TABLET ORAL
Qty: 30 | Refills: 0 | Status: ACTIVE | COMMUNITY
Start: 2021-01-22 | End: 1900-01-01

## 2021-01-22 NOTE — PHYSICAL EXAM
[de-identified] : General: Alert and oriented x3. In no acute distress. Pleasant in nature with a normal affect. No apparent respiratory distress.\par \par R Foot Exam\par Skin: Clean, dry, intact\par Inspection: No obvious malalignment, no masses, no swelling, no effusion\par Pulses: 2+ DP/PT pulses\par ROM: FOOT Limited extension to lesser toes, able to move great toe, ANKLE neutral degrees of dorsiflexion, 30 degrees of plantarflexion, 10 degrees of subtalar motion.\par Painful ROM: None\par Tenderness: +cuboid, +base of 4th metatarsal, +proximal 3rd metatarsal and midfoot laterally. No pain to 1st or 2nd metatarsal. No tenderness over the medial malleolus, No tenderness over the lateral malleolus, no CFL/ATFL/PTFL pain, no deltoid ligament pain. No heel pain. No Achilles tenderness. No 5th metatarsal pain. No pain to the LisFranc joint. No ttp over the posterior tibial tendon.\par Stability: Negative anterior/posterior drawer.\par Strength: 5/5 ADD/ABD/TA/GS/EHL/FHL/EDL\par Neuro: Sensation in tact to light touch throughout\par Additional tests: Negative Mortons test, negative tarsal tunnel tinels, negative single heel rise.  [de-identified] : 3V of the right foot were ordered obtained and reviewed by me today, 01/22/2021 , revealed: Calcaneal spur noted. No displaced fracture.

## 2021-01-22 NOTE — HISTORY OF PRESENT ILLNESS
[FreeTextEntry1] : 60 year old male presenting with right foot pain. The patient’s pain is noted to be a 9/10. The patient's pain began 3 weeks ago. The patient c/o pain at the top of his foot. The patient cannot attribute their pain to any specific injury, fall, or trauma. He states he did step off a ladder, but there was no injury.  The patient describes their pain as sharp and constant. The patient works as an orthotist. He c/o numbness in his toes. He is currently taking 9 Advil per day. No other complaints at this time.

## 2021-01-22 NOTE — ADDENDUM
[FreeTextEntry1] : I, Jadiel Conway, acted solely as a scribe for Dr. Bala Espinoza on this date 01/22/2021 .\par All medical record entries made by the Scribe were at my, Dr. Bala Espinoza, direction and personally dictated by me on 01/22/2021 . I have reviewed the chart and agree that the record accurately reflects my personal performance of the history, physical exam, assessment and plan. I have also personally directed, reviewed, and agreed with the chart.

## 2021-01-22 NOTE — DISCUSSION/SUMMARY
[de-identified] : Today I had a lengthy discussion with the patient regarding their right foot pain. I have addressed all the patient's concerns surrounding the pathology of their condition. I recommend the patient follow up with his primary care doctor regarding his high blood pressure. I recommend the patient utilize his CAM boot. I advised to utilize ice and elevate the foot. I recommend that the patient utilize meloxicam with food once per day as instructed. A prescription for the meloxicam was ordered for the patient in the office today. I recommend that the patient utilize Voltaren gel topically. At this time I would like to obtain advanced imaging of the patient's right foot. An MRI was ordered so I can find out more about the etiology of the patient's condition. The patient should follow up with the office after obtaining the MRI. The patient understood and verbally agreed to the treatment plan. All of their questions were answered and they were satisfied with the visit. The patient should call the office if they have any questions or experience worsening symptoms.

## 2021-01-29 ENCOUNTER — APPOINTMENT (OUTPATIENT)
Dept: ORTHOPEDIC SURGERY | Facility: CLINIC | Age: 61
End: 2021-01-29

## 2021-03-15 ENCOUNTER — RX RENEWAL (OUTPATIENT)
Age: 61
End: 2021-03-15

## 2022-01-25 NOTE — ASU DISCHARGE PLAN (ADULT/PEDIATRIC) - A. DRIVE A CAR, OPERATE POWER TOOLS OR MACHINERY
Statement Selected Hemigard Intro: Due to skin fragility and wound tension, it was decided to use HEMIGARD adhesive retention suture devices to permit a linear closure. The skin was cleaned and dried for a 6cm distance away from the wound. Excessive hair, if present, was removed to allow for adhesion.

## 2023-01-04 NOTE — PATIENT PROFILE ADULT. - NSSUBSTANCEUSE_GEN_ALL_CORE_SD
"Medical screening examination initiated.  I have conducted a focused provider triage encounter, findings are as follows:    Brief history of present illness:  Nonproductive cough x 3 days that has progressed since onset. Denies chest pain. States only feels SOB after coughing. Has had runny nose, chills, body aches. History of COPD and CHF.     Vitals:    01/04/23 1717   BP: (!) 142/90   BP Location: Left arm   Patient Position: Sitting   Pulse: 75   Resp: 18   Temp: 98.2 °F (36.8 °C)   TempSrc: Oral   SpO2: 97%   Weight: 56.7 kg (125 lb)   Height: 5' 7" (1.702 m)       Pertinent physical exam:  In no distress, rhonchi bilaterally.     Brief workup plan:  Labs, imaging    Preliminary workup initiated; this workup will be continued and followed by the physician or advanced practice provider that is assigned to the patient when roomed.  "
caffeine

## 2023-10-20 NOTE — ASU DISCHARGE PLAN (ADULT/PEDIATRIC) - NURSING INSTRUCTIONS
You received IV Tylenol for pain management at _11:00am __. Please DO NOT take any Tylenol (Acetaminophen) containing products, such as Vicodin, Percocet, Excedrin, and cold medications for the next 6 hours (until _5__ PM). DO NOT TAKE MORE THAN 3000 MG OF TYLENOL in a 24 hour period. n/a

## 2024-11-29 NOTE — STROKE CODE NOTE - CT READ
